# Patient Record
Sex: FEMALE | Race: BLACK OR AFRICAN AMERICAN | Employment: PART TIME | URBAN - METROPOLITAN AREA
[De-identification: names, ages, dates, MRNs, and addresses within clinical notes are randomized per-mention and may not be internally consistent; named-entity substitution may affect disease eponyms.]

---

## 2017-06-19 ENCOUNTER — OFFICE VISIT (OUTPATIENT)
Dept: FAMILY MEDICINE CLINIC | Age: 42
End: 2017-06-19

## 2017-06-19 VITALS
TEMPERATURE: 98.2 F | HEIGHT: 68 IN | SYSTOLIC BLOOD PRESSURE: 149 MMHG | WEIGHT: 253.6 LBS | HEART RATE: 87 BPM | DIASTOLIC BLOOD PRESSURE: 101 MMHG | BODY MASS INDEX: 38.43 KG/M2 | RESPIRATION RATE: 18 BRPM | OXYGEN SATURATION: 97 %

## 2017-06-19 DIAGNOSIS — Z01.419 WELL WOMAN EXAM WITH ROUTINE GYNECOLOGICAL EXAM: ICD-10-CM

## 2017-06-19 DIAGNOSIS — I10 ESSENTIAL HYPERTENSION WITH GOAL BLOOD PRESSURE LESS THAN 140/90: Primary | ICD-10-CM

## 2017-06-19 DIAGNOSIS — L81.9 HYPERPIGMENTED SKIN LESION: ICD-10-CM

## 2017-06-19 DIAGNOSIS — Z12.31 ENCOUNTER FOR SCREENING MAMMOGRAM FOR BREAST CANCER: ICD-10-CM

## 2017-06-19 DIAGNOSIS — R53.83 FATIGUE, UNSPECIFIED TYPE: ICD-10-CM

## 2017-06-19 DIAGNOSIS — N63.0 BREAST LUMP IN FEMALE: ICD-10-CM

## 2017-06-19 RX ORDER — FLUCONAZOLE 150 MG/1
150 TABLET ORAL DAILY
Qty: 1 TAB | Refills: 0 | Status: SHIPPED | OUTPATIENT
Start: 2017-06-19 | End: 2017-06-20

## 2017-06-19 RX ORDER — HYDROCHLOROTHIAZIDE 25 MG/1
25 TABLET ORAL DAILY
Qty: 90 TAB | Refills: 1 | Status: SHIPPED | OUTPATIENT
Start: 2017-06-19 | End: 2018-06-12 | Stop reason: SDUPTHER

## 2017-06-19 RX ORDER — AMLODIPINE BESYLATE 10 MG/1
10 TABLET ORAL DAILY
Qty: 90 TAB | Refills: 1 | Status: SHIPPED | OUTPATIENT
Start: 2017-06-19 | End: 2018-06-12 | Stop reason: SDUPTHER

## 2017-06-19 RX ORDER — LOSARTAN POTASSIUM 50 MG/1
50 TABLET ORAL DAILY
Qty: 90 TAB | Refills: 1 | Status: SHIPPED | OUTPATIENT
Start: 2017-06-19 | End: 2018-06-12 | Stop reason: SDUPTHER

## 2017-06-19 NOTE — PATIENT INSTRUCTIONS

## 2017-06-19 NOTE — MR AVS SNAPSHOT
Visit Information Date & Time Provider Department Dept. Phone Encounter #  
 6/19/2017 12:40 PM Villa Keenan, [address] [facility] [phone] [id number] Follow-up Instructions Return in about 1 year (around 6/19/2018) for wellness physical. . Upcoming Health Maintenance Date Due DTaP/Tdap/Td series (1 - Tdap) 9/8/1997 PAP AKA CERVICAL CYTOLOGY 4/15/2016 BREAST CANCER SCRN MAMMOGRAM 6/22/2017 INFLUENZA AGE 9 TO ADULT 8/1/2017 COLONOSCOPY 9/23/2019 Allergies as of 6/19/2017  Review Complete On: 6/19/2017 By: Villa Keenan DO Severity Noted Reaction Type Reactions Lisinopril  06/22/2015    Cough Current Immunizations  Never Reviewed No immunizations on file. Not reviewed this visit You Were Diagnosed With   
  
 Codes Comments Essential hypertension with goal blood pressure less than 140/90    -  Primary ICD-10-CM: I10 
ICD-9-CM: 401.9 Well woman exam with routine gynecological exam     ICD-10-CM: Z71.395 ICD-9-CM: V72.31 Fatigue, unspecified type     ICD-10-CM: R53.83 ICD-9-CM: 780.79 Hyperpigmented skin lesion     ICD-10-CM: L81.9 ICD-9-CM: 709.00 Encounter for screening mammogram for breast cancer     ICD-10-CM: Z12.31 
ICD-9-CM: V76.12 Breast lump in female     ICD-10-CM: N63 
ICD-9-CM: 611.72 Vitals BP Pulse Temp Resp Height(growth percentile) Weight(growth percentile) (!) 149/101 (BP 1 Location: Right arm, BP Patient Position: Sitting) 87 98.2 °F (36.8 °C) (Oral) 18 5' 8\" (1.727 m) 253 lb 9.6 oz (115 kg) SpO2 BMI OB Status Smoking Status 97% 38.56 kg/m2 Having regular periods Never Smoker BMI and BSA Data Body Mass Index Body Surface Area 38.56 kg/m 2 2.35 m 2 Preferred Pharmacy Pharmacy Name Phone [address] [address] [phone] Your Updated Medication List  
  
 This list is accurate as of: 6/19/17  2:27 PM.  Always use your most recent med list. amLODIPine 10 mg tablet Commonly known as:  Sonal Hernandez Take 1 Tab by mouth daily. fluconazole 150 mg tablet Commonly known as:  DIFLUCAN Take 1 Tab by mouth daily for 1 day. FDA advises cautious prescribing of oral fluconazole in pregnancy. hydroCHLOROthiazide 25 mg tablet Commonly known as:  HYDRODIURIL Take 1 Tab by mouth daily. ibuprofen 600 mg tablet Commonly known as:  MOTRIN Take 1 Tab by mouth every six (6) hours as needed for Pain.  
  
 losartan 50 mg tablet Commonly known as:  COZAAR Take 1 Tab by mouth daily. Prescriptions Sent to Pharmacy Refills  
 fluconazole (DIFLUCAN) 150 mg tablet 0 Sig: Take 1 Tab by mouth daily for 1 day. FDA advises cautious prescribing of oral fluconazole in pregnancy. Class: Normal  
 Pharmacy: [facility] [address] [place] [address] [address]  #: [phone] Route: Oral  
  
We Performed the Following REFERRAL TO DERMATOLOGY [REF19 Custom] Comments:  
 Please evaluate patient for hyperpigmented lesions Follow-up Instructions Return in about 1 year (around 6/19/2018) for wellness physical. . To-Do List   
 06/19/2017 Lab:  CBC WITH AUTOMATED DIFF   
  
 06/19/2017 Lab:  LIPID PANEL   
  
 06/19/2017 Imaging:  PARRISH MAMMO BI DX INCL CAD   
  
 06/19/2017 Lab:  METABOLIC PANEL, COMPREHENSIVE   
  
 06/19/2017 Pathology:  PAP IG, APTIMA HPV AND RFX 16/18,45 ([id number])   
  
 06/19/2017 Lab:  T4, FREE   
  
 06/19/2017 Lab:  TSH 3RD GENERATION   
  
 06/19/2017 Imaging:  US BREASTS LIMITED=<3 QUAD Referral Information Referral ID Referred By Referred To  
  
 [id number] Luigi Ann MD   
   [facility], [address] [place] [address] Road Phone: [phone] Fax: [phone] Visits Status Start Date End Date 1 New Request 6/19/17 6/19/18 If your referral has a status of pending review or denied, additional information will be sent to support the outcome of this decision. Patient Instructions Well Visit, Ages 25 to 48: Care Instructions Your Care Instructions Physical exams can help you stay healthy. Your doctor has checked your overall health and may have suggested ways to take good care of yourself. He or she also may have recommended tests. At home, you can help prevent illness with healthy eating, regular exercise, and other steps. Follow-up care is a key part of your treatment and safety. Be sure to make and go to all appointments, and call your doctor if you are having problems. It's also a good idea to know your test results and keep a list of the medicines you take. How can you care for yourself at home? · Reach and stay at a healthy weight. This will lower your risk for many problems, such as obesity, diabetes, heart disease, and high blood pressure. · Get at least 30 minutes of physical activity on most days of the week. Walking is a good choice. You also may want to do other activities, such as running, swimming, cycling, or playing tennis or team sports. Discuss any changes in your exercise program with your doctor. · Do not smoke or allow others to smoke around you. If you need help quitting, talk to your doctor about stop-smoking programs and medicines. These can increase your chances of quitting for good. · Talk to your doctor about whether you have any risk factors for sexually transmitted infections (STIs). Having one sex partner (who does not have STIs and does not have sex with anyone else) is a good way to avoid these infections. · Use birth control if you do not want to have children at this time. Talk with your doctor about the choices available and what might be best for you. · Protect your skin from too much sun. When you're outdoors from 10 a.m. to 4 p.m., stay in the shade or cover up with clothing and a hat with a wide brim. Wear sunglasses that block UV rays. Even when it's cloudy, put broad-spectrum sunscreen (SPF 30 or higher) on any exposed skin. · See a dentist one or two times a year for checkups and to have your teeth cleaned. · Wear a seat belt in the car. · Drink alcohol in moderation, if at all. That means no more than 2 drinks a day for men and 1 drink a day for women. Follow your doctor's advice about when to have certain tests. These tests can spot problems early. For everyone · Cholesterol. Have the fat (cholesterol) in your blood tested after age 21. Your doctor will tell you how often to have this done based on your age, family history, or other things that can increase your risk for heart disease. · Blood pressure. Have your blood pressure checked during a routine doctor visit. Your doctor will tell you how often to check your blood pressure based on your age, your blood pressure results, and other factors. · Vision. Talk with your doctor about how often to have a glaucoma test. 
· Diabetes. Ask your doctor whether you should have tests for diabetes. · Colon cancer. Have a test for colon cancer at age 48. You may have one of several tests. If you are younger than 48, you may need a test earlier if you have any risk factors. Risk factors include whether you already had a precancerous polyp removed from your colon or whether your parent, brother, sister, or child has had colon cancer. For women · Breast exam and mammogram. Talk to your doctor about when you should have a clinical breast exam and a mammogram. Medical experts differ on whether and how often women under 50 should have these tests. Your doctor can help you decide what is right for you. · Pap test and pelvic exam. Begin Pap tests at age 24.  A Pap test is the best way to find cervical cancer. The test often is part of a pelvic exam. Ask how often to have this test. 
· Tests for sexually transmitted infections (STIs). Ask whether you should have tests for STIs. You may be at risk if you have sex with more than one person, especially if your partners do not wear condoms. For men · Tests for sexually transmitted infections (STIs). Ask whether you should have tests for STIs. You may be at risk if you have sex with more than one person, especially if you do not wear a condom. · Testicular cancer exam. Ask your doctor whether you should check your testicles regularly. · Prostate exam. Talk to your doctor about whether you should have a blood test (called a PSA test) for prostate cancer. Experts differ on whether and when men should have this test. Some experts suggest it if you are older than 39 and are -American or have a father or brother who got prostate cancer when he was younger than 72. When should you call for help? Watch closely for changes in your health, and be sure to contact your doctor if you have any problems or symptoms that concern you. Where can you learn more? Go to http://latoya-david.info/. Enter P072 in the search box to learn more about \"Well Visit, Ages 25 to 48: Care Instructions. \" Current as of: July 19, 2016 Content Version: 11.2 © 4535-3303 [employer]. Care instructions adapted under license by [employer] (which disclaims liability or warranty for this information). If you have questions about a medical condition or this instruction, always ask your healthcare professional. [place] [address] any warranty or liability for your use of this information. Introducing [facility]! [facility] introduces [employer] patient portal. Now you can access parts of your medical record, email your doctor's office, and request medication refills online. 1. In your internet browser, go to [email or web address] 2. Click on the First Time User? Click Here link in the Sign In box. You will see the New Member Sign Up page. 3. Enter your [employer] Access Code exactly as it appears below. You will not need to use this code after youve completed the sign-up process. If you do not sign up before the expiration date, you must request a new code. · [employer] Access Code: [email or web address] Expires: 9/17/2017  2:23 PM 
 
4. Enter the last four digits of your Social Security Number (xxxx) and Date of Birth (mm/dd/yyyy) as indicated and click Submit. You will be taken to the next sign-up page. 5. Create a Reksoftt ID. This will be your [employer] login ID and cannot be changed, so think of one that is secure and easy to remember. 6. Create a [employer] password. You can change your password at any time. 7. Enter your Password Reset Question and Answer. This can be used at a later time if you forget your password. 8. Enter your e-mail address. You will receive e-mail notification when new information is available in [address] 9. Click Sign Up. You can now view and download portions of your medical record. 10. Click the Download Summary menu link to download a portable copy of your medical information. If you have questions, please visit the Frequently Asked Questions section of the [employer] website. Remember, [employer] is NOT to be used for urgent needs. For medical emergencies, dial 911. Now available from your iPhone and Android! Please provide this summary of care documentation to your next provider. Your primary care clinician is listed as Catina Lopez. If you have any questions after today's visit, please call [phone].

## 2017-06-19 NOTE — PROGRESS NOTES
Subjective:   Enid Powell is a 37 y.o. female who presents for annual routine Pap and checkup. LMP: No LMP recorded. Last PAP?: 04/15/2013  History of abnormal PAP: None  Concerns for STD?: no  Abnormal vaginal discharge: no   Family history for GYN cancer: none  Family history breast cancer: PGM  Mammogram: 06/22/2016  Family history for colon cancer: paternal aunt and broter  Colonoscopy: done on 09/23/2014, normal. Pt advised to repeat in 5 years. Current Outpatient Prescriptions   Medication Sig Dispense Refill    hydrochlorothiazide (HYDRODIURIL) 25 mg tablet Take 1 Tab by mouth daily. 90 Tab 1    losartan (COZAAR) 50 mg tablet Take 1 Tab by mouth daily. 90 Tab 1    amLODIPine (NORVASC) 10 mg tablet Take 1 Tab by mouth daily. 90 Tab 1    ibuprofen (MOTRIN) 600 mg tablet Take 1 Tab by mouth every six (6) hours as needed for Pain. 20 Tab 0       Allergies   Allergen Reactions    Lisinopril Cough       Past Medical History:   Diagnosis Date    Anxiety     Elevated blood pressure (not hypertension) 6/10/2014    Hypertension     Rectal bleeding        Past Surgical History:   Procedure Laterality Date    COLONOSCOPY  9/24/2014 Return 9/25/2019    Dr. Adriana Weston; dx    HX BREAST LUMPECTOMY Right     benign.  HX COLONOSCOPY  age 45    rectal bleeding hemorrhoids found. f/u age 49 or sooner if bleeding.         Family History   Problem Relation Age of Onset    Hypertension Mother     Hypertension Father     Hypertension Other     Diabetes Maternal Grandmother     Stroke Maternal Grandmother     Cancer Maternal Grandmother      Kidney CA    Diabetes Maternal Grandfather     Cancer Maternal Grandfather     Cancer Paternal Grandmother      Breast CA, age 52's   [place] Breast Cancer Paternal Grandmother     Cancer Other 39       Social History     Social History    Marital status: SINGLE     Spouse name: N/A    Number of children: N/A    Years of education: N/A     Occupational History    Not on file. Social History Main Topics    Smoking status: Never Smoker    Smokeless tobacco: Never Used    Alcohol use 0.0 oz/week      Comment: 1-3 glass per day    Drug use: No    Sexual activity: Yes     Partners: Male     Birth control/ protection: None     Other Topics Concern     Service No    Blood Transfusions No    Weight Concern Yes    Exercise Yes    Seat Belt Yes    Self-Exams No     Social History Narrative       Review of Systems   Constitutional: Positive for malaise/fatigue. Negative for chills and fever. Eyes: Negative for blurred vision. Respiratory: Negative for shortness of breath. Cardiovascular: Negative for chest pain, palpitations and leg swelling. Gastrointestinal: Negative for constipation, diarrhea, nausea and vomiting. Musculoskeletal: Negative for joint pain. Neurological: Negative for headaches. Objective:     Visit Vitals    BP (!) 149/101 (BP 1 Location: Right arm, BP Patient Position: Sitting)    Pulse 87    Temp 98.2 °F (36.8 °C) (Oral)    Resp 18    Ht 5' 8\" (1.727 m)    Wt 253 lb 9.6 oz (115 kg)    SpO2 97%    BMI 38.56 kg/m2       Physical Exam   Constitutional: She is oriented to person, place, and time and well-developed, well-nourished, and in no distress. HENT:   Right Ear: Tympanic membrane, external ear and ear canal normal.   Left Ear: Tympanic membrane, external ear and ear canal normal.   Nose: Nose normal.   Mouth/Throat: Oropharynx is clear and moist.   Eyes: Pupils are equal, round, and reactive to light. Neck: Normal range of motion. Neck supple. No thyromegaly present. Cardiovascular: Normal rate, regular rhythm, normal heart sounds and intact distal pulses. No murmur heard. Pulmonary/Chest: Effort normal and breath sounds normal. She has no wheezes. Neurological: She is alert and oriented to person, place, and time. GCS score is 15. Skin: Skin is warm and dry.  Lesion (Hyperpigmented lesions on legs, forarms, neck, back and abdomen. ) noted. Vitals reviewed. Pelvic exam: VULVA: normal appearing vulva with no masses, tenderness or lesions, VAGINA: normal appearing vagina with normal color and discharge, no lesions, CERVIX: normal appearing cervix without discharge or lesions, UTERUS: uterus is nontender, fibroid uterus noted just below umbilicus, ADNEXA: normal adnexa in size, nontender and no masses. Assessment/Plan:   well woman  mammogram  pap smear  additional lab tests per orders  return annually or prn    ICD-10-CM ICD-9-CM    1. Essential hypertension with goal blood pressure less than 140/90 I10 401.9 LIPID PANEL      METABOLIC PANEL, COMPREHENSIVE   2. Well woman exam with routine gynecological exam Z01.419 V72.31    3. Fatigue, unspecified type R53.83 780.79 CBC WITH AUTOMATED DIFF      TSH 3RD GENERATION      T4, FREE   4. Hyperpigmented skin lesion L81.9 709.00 REFERRAL TO DERMATOLOGY   . Patient given opportunity to ask questions. Questions answered. Patient understands plan of care. Follow-up Disposition:  Return in about 1 year (around 6/19/2018) for wellness physical. .        Written by Norita Goltz, as dictated by DO. CHALINO Black, Dr. Chase Turner, confirm that all documentation is accurate.

## 2017-06-19 NOTE — PROGRESS NOTES
1. Have you been to the ER, urgent care clinic since your last visit? Hospitalized since your last visit? No    2. Have you seen or consulted any other health care providers outside of the [address] [place] [address] since your last visit? Include any pap smears or colon screening.  No  \

## 2017-06-19 NOTE — PROGRESS NOTES
Subjective:     HPI:  Rizwan Mahoney is a 37 y.o. female who presents to the office today to f/u on hypertension and c/o menorrhagia and skin problem. Menorrhagia   Pt states that she has cramps and pain in the first couple of days of her menstrual cycle. Pt reports that the first 2 days of her menstrual cycle is comparatively heavier. Pt states that she changes her pad up to 6 times, about every two hours, during her heaviest day. Pt reports being fatigued all the time. Hypertension  The patient presents for follow up of hypertension. Pt's BP is 149/101 in the office today. Cardiovascular ROS: taking medications as instructed, no medication side effects noted, no TIA's, no chest pain on exertion, no dyspnea on exertion, no swelling of ankles. Skin Problem  Pt has itchy black spots on her skin all over her body. Pt states that she has seen [facility] for her skin problem and reports that medication given by them did not help. Current Outpatient Prescriptions   Medication Sig Dispense Refill    hydrochlorothiazide (HYDRODIURIL) 25 mg tablet Take 1 Tab by mouth daily. 90 Tab 1    losartan (COZAAR) 50 mg tablet Take 1 Tab by mouth daily. 90 Tab 1    amLODIPine (NORVASC) 10 mg tablet Take 1 Tab by mouth daily. 90 Tab 1    ibuprofen (MOTRIN) 600 mg tablet Take 1 Tab by mouth every six (6) hours as needed for Pain. 20 Tab 0        Allergies   Allergen Reactions    Lisinopril Cough       Past Medical History:   Diagnosis Date    Anxiety     Elevated blood pressure (not hypertension) 6/10/2014    Hypertension     Rectal bleeding         Past Surgical History:   Procedure Laterality Date    COLONOSCOPY  9/24/2014 Return 9/25/2019    Dr. Shazia Florez; dx    HX BREAST LUMPECTOMY Right     benign.  HX COLONOSCOPY  age 45    rectal bleeding hemorrhoids found. f/u age 49 or sooner if bleeding.         Family History   Problem Relation Age of Onset    Hypertension Mother    [facility] Hypertension Father     Hypertension Other     Diabetes Maternal Grandmother     Stroke Maternal Grandmother     Cancer Maternal Grandmother      Kidney CA    Diabetes Maternal Grandfather     Cancer Maternal Grandfather     Cancer Paternal Grandmother      Breast CA, age 52's    Breast Cancer Paternal Grandmother     Cancer Other 39       Social History     Social History    Marital status: SINGLE     Spouse name: N/A    Number of children: N/A    Years of education: N/A     Occupational History    Not on file. Social History Main Topics    Smoking status: Never Smoker    Smokeless tobacco: Never Used    Alcohol use 0.0 oz/week      Comment: 1-3 glass per day    Drug use: No    Sexual activity: Yes     Partners: Male     Birth control/ protection: None     Other Topics Concern     Service No    Blood Transfusions No    Weight Concern Yes    Exercise Yes    Seat Belt Yes    Self-Exams No     Social History Narrative       REVIEW OF SYSTEM:  Review of Systems   Constitutional: Positive for malaise/fatigue. Negative for chills and fever. Eyes: Negative for blurred vision. Respiratory: Negative for shortness of breath. Cardiovascular: Negative for chest pain, palpitations and leg swelling. Gastrointestinal: Negative for constipation, diarrhea, nausea and vomiting. Musculoskeletal: Negative for joint pain. Neurological: Negative for headaches. Objective:     Visit Vitals    BP (!) 149/101 (BP 1 Location: Right arm, BP Patient Position: Sitting)    Pulse 87    Temp 98.2 °F (36.8 °C) (Oral)    Resp 18    Ht 5' 8\" (1.727 m)    Wt 253 lb 9.6 oz (115 kg)    SpO2 97%    BMI 38.56 kg/m2       PHYSICAL EXAM:  Physical Exam   Constitutional: She is oriented to person, place, and time and well-developed, well-nourished, and in no distress.    HENT:   Right Ear: Tympanic membrane, external ear and ear canal normal.   Left Ear: Tympanic membrane, external ear and ear canal normal.   Nose: Nose normal.   Mouth/Throat: Oropharynx is clear and moist.   Eyes: Pupils are equal, round, and reactive to light. Neck: Normal range of motion. Neck supple. No thyromegaly present. Cardiovascular: Normal rate, regular rhythm, normal heart sounds and intact distal pulses. No murmur heard. Pulmonary/Chest: Effort normal and breath sounds normal. She has no wheezes. Neurological: She is alert and oriented to person, place, and time. GCS score is 15. Skin: Skin is warm and dry. Lesion noted. Multiple Hyperpigmented lesions to legs, forearms, back, neck and abdomen. Vitals reviewed. Assessment/Plan:       ICD-10-CM ICD-9-CM    1. Essential hypertension with goal blood pressure less than 140/90 I10 401.9 LIPID PANEL      METABOLIC PANEL, COMPREHENSIVE      hydroCHLOROthiazide (HYDRODIURIL) 25 mg tablet      losartan (COZAAR) 50 mg tablet      amLODIPine (NORVASC) 10 mg tablet      LIPID PANEL      METABOLIC PANEL, COMPREHENSIVE   2. Well woman exam with routine gynecological exam Z01.419 V72.31 fluconazole (DIFLUCAN) 150 mg tablet      PAP IG, APTIMA HPV AND RFX 16/18,45 ([id number])      PAP IG, APTIMA HPV AND RFX 16/18,45 ([id number])   3. Fatigue, unspecified type R53.83 780.79 CBC WITH AUTOMATED DIFF      TSH 3RD GENERATION      T4, FREE      CBC WITH AUTOMATED DIFF      TSH 3RD GENERATION      T4, FREE   4. Hyperpigmented skin lesion L81.9 709.00 REFERRAL TO DERMATOLOGY   5. Encounter for screening mammogram for breast cancer Z12.31 V76.12 PARRISH MAMMO BI DX INCL CAD      US BREASTS LIMITED=<3 QUAD   6. Breast lump in female N63 611.72 [facility] MAMMO BI DX INCL CAD      US BREASTS LIMITED=<3 QUAD     Patient given opportunity to ask questions. Questions answered. Patient understands plan of care. Follow-up Disposition: Not on File        Written by Chika Arellano, as dictated by eJnnifer Cook DO.    I, Dr. Jennifer Cook, confirm that all documentation is accurate.

## 2017-06-20 LAB
A-G RATIO,AGRAT: 1.6 RATIO (ref 1.1–2.6)
ABSOLUTE LYMPHOCYTE COUNT, 10803: 2.4 K/UL (ref 1–4.8)
ALBUMIN SERPL-MCNC: 4.3 G/DL (ref 3.5–5)
ALP SERPL-CCNC: 73 U/L (ref 25–115)
ALT SERPL-CCNC: 17 U/L (ref 5–40)
ANION GAP SERPL CALC-SCNC: 17 MMOL/L
AST SERPL W P-5'-P-CCNC: 17 U/L (ref 10–37)
BASOPHILS # BLD: 0 K/UL (ref 0–0.2)
BASOPHILS NFR BLD: 1 % (ref 0–2)
BILIRUB SERPL-MCNC: 0.2 MG/DL (ref 0.2–1.2)
BUN SERPL-MCNC: 13 MG/DL (ref 6–22)
CALCIUM SERPL-MCNC: 9 MG/DL (ref 8.4–10.5)
CHLORIDE SERPL-SCNC: 100 MMOL/L (ref 98–110)
CHOLEST SERPL-MCNC: 215 MG/DL (ref 110–200)
CO2 SERPL-SCNC: 24 MMOL/L (ref 20–32)
CREAT SERPL-MCNC: 0.8 MG/DL (ref 0.5–1.2)
EOSINOPHIL # BLD: 0.1 K/UL (ref 0–0.5)
EOSINOPHIL NFR BLD: 2 % (ref 0–6)
ERYTHROCYTE [DISTWIDTH] IN BLOOD BY AUTOMATED COUNT: 16.1 % (ref 10–16)
GFRAA, 66117: >60
GFRNA, 66118: >60
GLOBULIN,GLOB: 2.7 G/DL (ref 2–4)
GLUCOSE SERPL-MCNC: 85 MG/DL (ref 65–99)
GRANULOCYTES,GRANS: 47 % (ref 40–75)
HCT VFR BLD AUTO: 38.6 % (ref 35.1–46.5)
HDLC SERPL-MCNC: 82 MG/DL (ref 40–59)
HGB BLD-MCNC: 12.1 G/DL (ref 11.7–15.5)
HPV H RFLX, 13184: NEGATIVE
LDLC SERPL CALC-MCNC: 120 MG/DL (ref 50–99)
LYMPHOCYTES, LYMLT: 41 % (ref 27–45)
MCH RBC QN AUTO: 28 PG (ref 26–34)
MCHC RBC AUTO-ENTMCNC: 31 G/DL (ref 32–36)
MCV RBC AUTO: 89 FL (ref 80–95)
MONOCYTES # BLD: 0.5 K/UL (ref 0.1–0.9)
MONOCYTES NFR BLD: 9 % (ref 3–9)
NEUTROPHILS # BLD AUTO: 2.7 K/UL (ref 1.8–7.7)
PLATELET # BLD AUTO: 240 K/UL (ref 140–440)
PMV BLD AUTO: 12.3 FL (ref 6–10.8)
POTASSIUM SERPL-SCNC: 4.3 MMOL/L (ref 3.5–5.5)
PROT SERPL-MCNC: 7 G/DL (ref 6.4–8.3)
RBC # BLD AUTO: 4.33 M/UL (ref 3.8–5.2)
SODIUM SERPL-SCNC: 141 MMOL/L (ref 133–145)
T4 FREE SERPL-MCNC: 1.1 NG/DL (ref 0.9–1.8)
TRIGL SERPL-MCNC: 66 MG/DL (ref 40–149)
TSH SERPL DL<=0.005 MIU/L-ACNC: 1.19 MCU/ML (ref 0.27–4.2)
VLDLC SERPL CALC-MCNC: 13 MG/DL (ref 8–30)
WBC # BLD AUTO: 5.8 K/UL (ref 4–11)

## 2017-06-21 LAB — PAP IMAGE GUIDED, 8900296: NORMAL

## 2018-06-05 ENCOUNTER — APPOINTMENT (OUTPATIENT)
Dept: CT IMAGING | Age: 43
End: 2018-06-05
Attending: NURSE PRACTITIONER
Payer: COMMERCIAL

## 2018-06-05 ENCOUNTER — HOSPITAL ENCOUNTER (EMERGENCY)
Age: 43
Discharge: HOME | End: 2018-06-05
Attending: EMERGENCY MEDICINE
Payer: COMMERCIAL

## 2018-06-05 VITALS
OXYGEN SATURATION: 100 % | DIASTOLIC BLOOD PRESSURE: 74 MMHG | RESPIRATION RATE: 16 BRPM | BODY MASS INDEX: 35.7 KG/M2 | TEMPERATURE: 98.6 F | WEIGHT: 241 LBS | SYSTOLIC BLOOD PRESSURE: 126 MMHG | HEIGHT: 69 IN | HEART RATE: 71 BPM

## 2018-06-05 DIAGNOSIS — R10.9 ABDOMINAL PAIN, UNSPECIFIED ABDOMINAL LOCATION: ICD-10-CM

## 2018-06-05 DIAGNOSIS — R19.00 ABDOMINAL MASS, UNSPECIFIED ABDOMINAL LOCATION: ICD-10-CM

## 2018-06-05 DIAGNOSIS — D25.9 UTERINE LEIOMYOMA, UNSPECIFIED LOCATION: Primary | ICD-10-CM

## 2018-06-05 DIAGNOSIS — R03.0 ELEVATED BLOOD PRESSURE READING: ICD-10-CM

## 2018-06-05 LAB
ALBUMIN SERPL-MCNC: 3.6 G/DL (ref 3.4–5)
ALBUMIN/GLOB SERPL: 0.9 {RATIO} (ref 0.8–1.7)
ALP SERPL-CCNC: 61 U/L (ref 45–117)
ALT SERPL-CCNC: 16 U/L (ref 13–56)
ANION GAP SERPL CALC-SCNC: 8 MMOL/L (ref 3–18)
APPEARANCE UR: CLEAR
AST SERPL-CCNC: 15 U/L (ref 15–37)
BASOPHILS # BLD: 0.1 K/UL (ref 0–0.06)
BASOPHILS NFR BLD: 1 % (ref 0–2)
BILIRUB SERPL-MCNC: 0.2 MG/DL (ref 0.2–1)
BILIRUB UR QL: NEGATIVE
BUN SERPL-MCNC: 14 MG/DL (ref 7–18)
BUN/CREAT SERPL: 14 (ref 12–20)
CALCIUM SERPL-MCNC: 8.2 MG/DL (ref 8.5–10.1)
CHLORIDE SERPL-SCNC: 106 MMOL/L (ref 100–108)
CO2 SERPL-SCNC: 26 MMOL/L (ref 21–32)
COLOR UR: YELLOW
CREAT SERPL-MCNC: 1.02 MG/DL (ref 0.6–1.3)
DIFFERENTIAL METHOD BLD: ABNORMAL
EOSINOPHIL # BLD: 0.1 K/UL (ref 0–0.4)
EOSINOPHIL NFR BLD: 2 % (ref 0–5)
ERYTHROCYTE [DISTWIDTH] IN BLOOD BY AUTOMATED COUNT: 15.3 % (ref 11.6–14.5)
GLOBULIN SER CALC-MCNC: 3.9 G/DL (ref 2–4)
GLUCOSE SERPL-MCNC: 80 MG/DL (ref 74–99)
GLUCOSE UR STRIP.AUTO-MCNC: NEGATIVE MG/DL
HCG UR QL: NEGATIVE
HCT VFR BLD AUTO: 36.3 % (ref 35–45)
HGB BLD-MCNC: 12.2 G/DL (ref 12–16)
HGB UR QL STRIP: NEGATIVE
KETONES UR QL STRIP.AUTO: NEGATIVE MG/DL
LEUKOCYTE ESTERASE UR QL STRIP.AUTO: NEGATIVE
LIPASE SERPL-CCNC: 133 U/L (ref 73–393)
LYMPHOCYTES # BLD: 2.2 K/UL (ref 0.9–3.6)
LYMPHOCYTES NFR BLD: 36 % (ref 21–52)
MCH RBC QN AUTO: 27.9 PG (ref 24–34)
MCHC RBC AUTO-ENTMCNC: 33.6 G/DL (ref 31–37)
MCV RBC AUTO: 83.1 FL (ref 74–97)
MONOCYTES # BLD: 0.5 K/UL (ref 0.05–1.2)
MONOCYTES NFR BLD: 9 % (ref 3–10)
NEUTS SEG # BLD: 3.1 K/UL (ref 1.8–8)
NEUTS SEG NFR BLD: 52 % (ref 40–73)
NITRITE UR QL STRIP.AUTO: NEGATIVE
PH UR STRIP: 5 [PH] (ref 5–8)
PLATELET # BLD AUTO: 325 K/UL (ref 135–420)
PMV BLD AUTO: 11.7 FL (ref 9.2–11.8)
POTASSIUM SERPL-SCNC: 4 MMOL/L (ref 3.5–5.5)
PROT SERPL-MCNC: 7.5 G/DL (ref 6.4–8.2)
PROT UR STRIP-MCNC: NEGATIVE MG/DL
RBC # BLD AUTO: 4.37 M/UL (ref 4.2–5.3)
SODIUM SERPL-SCNC: 140 MMOL/L (ref 136–145)
SP GR UR REFRACTOMETRY: 1.03 (ref 1–1.03)
UROBILINOGEN UR QL STRIP.AUTO: 0.2 EU/DL (ref 0.2–1)
WBC # BLD AUTO: 6 K/UL (ref 4.6–13.2)

## 2018-06-05 PROCEDURE — 80053 COMPREHEN METABOLIC PANEL: CPT

## 2018-06-05 PROCEDURE — 85025 COMPLETE CBC W/AUTO DIFF WBC: CPT

## 2018-06-05 PROCEDURE — 99282 EMERGENCY DEPT VISIT SF MDM: CPT

## 2018-06-05 PROCEDURE — 74011636320 HC RX REV CODE- 636/320: Performed by: EMERGENCY MEDICINE

## 2018-06-05 PROCEDURE — 74177 CT ABD & PELVIS W/CONTRAST: CPT

## 2018-06-05 PROCEDURE — 81003 URINALYSIS AUTO W/O SCOPE: CPT

## 2018-06-05 PROCEDURE — 96374 THER/PROPH/DIAG INJ IV PUSH: CPT

## 2018-06-05 PROCEDURE — 74011250636 HC RX REV CODE- 250/636: Performed by: NURSE PRACTITIONER

## 2018-06-05 PROCEDURE — 83690 ASSAY OF LIPASE: CPT

## 2018-06-05 PROCEDURE — 81025 URINE PREGNANCY TEST: CPT

## 2018-06-05 RX ORDER — NAPROXEN SODIUM 550 MG/1
550 TABLET ORAL 2 TIMES DAILY WITH MEALS
Qty: 20 TAB | Refills: 0 | Status: SHIPPED | OUTPATIENT
Start: 2018-06-05 | End: 2018-06-12 | Stop reason: SDUPTHER

## 2018-06-05 RX ORDER — HYDROCODONE BITARTRATE AND ACETAMINOPHEN 5; 325 MG/1; MG/1
1 TABLET ORAL
Qty: 6 TAB | Refills: 0 | Status: SHIPPED | OUTPATIENT
Start: 2018-06-05 | End: 2018-06-25 | Stop reason: ALTCHOICE

## 2018-06-05 RX ORDER — LORAZEPAM 2 MG/ML
1 INJECTION INTRAMUSCULAR
Status: DISCONTINUED | OUTPATIENT
Start: 2018-06-05 | End: 2018-06-05 | Stop reason: HOSPADM

## 2018-06-05 RX ORDER — HYDRALAZINE HYDROCHLORIDE 20 MG/ML
20 INJECTION INTRAMUSCULAR; INTRAVENOUS ONCE
Status: COMPLETED | OUTPATIENT
Start: 2018-06-05 | End: 2018-06-05

## 2018-06-05 RX ADMIN — HYDRALAZINE HYDROCHLORIDE 20 MG: 20 INJECTION INTRAMUSCULAR; INTRAVENOUS at 15:47

## 2018-06-05 RX ADMIN — IOPAMIDOL 80 ML: 612 INJECTION, SOLUTION INTRAVENOUS at 15:06

## 2018-06-05 NOTE — DISCHARGE INSTRUCTIONS
Abdominal Pain: Care Instructions  Your Care Instructions    Abdominal pain has many possible causes. Some aren't serious and get better on their own in a few days. Others need more testing and treatment. If your pain continues or gets worse, you need to be rechecked and may need more tests to find out what is wrong. You may need surgery to correct the problem. Don't ignore new symptoms, such as fever, nausea and vomiting, urination problems, pain that gets worse, and dizziness. These may be signs of a more serious problem. Your doctor may have recommended a follow-up visit in the next 8 to 12 hours. If you are not getting better, you may need more tests or treatment. The doctor has checked you carefully, but problems can develop later. If you notice any problems or new symptoms, get medical treatment right away. Follow-up care is a key part of your treatment and safety. Be sure to make and go to all appointments, and call your doctor if you are having problems. It's also a good idea to know your test results and keep a list of the medicines you take. How can you care for yourself at home? · Rest until you feel better. · To prevent dehydration, drink plenty of fluids, enough so that your urine is light yellow or clear like water. Choose water and other caffeine-free clear liquids until you feel better. If you have kidney, heart, or liver disease and have to limit fluids, talk with your doctor before you increase the amount of fluids you drink. · If your stomach is upset, eat mild foods, such as rice, dry toast or crackers, bananas, and applesauce. Try eating several small meals instead of two or three large ones. · Wait until 48 hours after all symptoms have gone away before you have spicy foods, alcohol, and drinks that contain caffeine. · Do not eat foods that are high in fat. · Avoid anti-inflammatory medicines such as aspirin, ibuprofen (Advil, Motrin), and naproxen (Aleve).  These can cause stomach upset. Talk to your doctor if you take daily aspirin for another health problem. When should you call for help? Call 911 anytime you think you may need emergency care. For example, call if:  ? · You passed out (lost consciousness). ? · You pass maroon or very bloody stools. ? · You vomit blood or what looks like coffee grounds. ? · You have new, severe belly pain. ?Call your doctor now or seek immediate medical care if:  ? · Your pain gets worse, especially if it becomes focused in one area of your belly. ? · You have a new or higher fever. ? · Your stools are black and look like tar, or they have streaks of blood. ? · You have unexpected vaginal bleeding. ? · You have symptoms of a urinary tract infection. These may include:  ¨ Pain when you urinate. ¨ Urinating more often than usual.  ¨ Blood in your urine. ? · You are dizzy or lightheaded, or you feel like you may faint. ? Watch closely for changes in your health, and be sure to contact your doctor if:  ? · You are not getting better after 1 day (24 hours). Where can you learn more? Go to http://latoyaBottomline Technologiesdavid.info/. Enter H960 in the search box to learn more about \"Abdominal Pain: Care Instructions. \"  Current as of: March 20, 2017  Content Version: 11.4  © 2567-4468 [employer]. Care instructions adapted under license by [employer] (which disclaims liability or warranty for this information). If you have questions about a medical condition or this instruction, always ask your healthcare professional. [place] [address] any warranty or liability for your use of this information. FatTail Activation    Thank you for requesting access to [employer]Tail. Please follow the instructions below to securely access and download your online medical record.  FatTail allows you to send messages to your doctor, view your test results, renew your prescriptions, schedule appointments, and more.    How Do I Sign Up? 1. In your internet browser, go to [email or web address]  2. Click on the First Time User? Click Here link in the Sign In box. You will be redirect to the New Member Sign Up page. 3. Enter your [facility] Access Code exactly as it appears below. You will not need to use this code after youve completed the sign-up process. If you do not sign up before the expiration date, you must request a new code. [facility] Access Code: [email or web address]  Expires: 9/3/2018 12:14 PM (This is the date your [facility] access code will )    4. Enter the last four digits of your Social Security Number (xxxx) and Date of Birth (mm/dd/yyyy) as indicated and click Submit. You will be taken to the next sign-up page. 5. Create a [facility] ID. This will be your [facility] login ID and cannot be changed, so think of one that is secure and easy to remember. 6. Create a [facility] password. You can change your password at any time. 7. Enter your Password Reset Question and Answer. This can be used at a later time if you forget your password. 8. Enter your e-mail address. You will receive e-mail notification when new information is available in [address] 9. Click Sign Up. You can now view and download portions of your medical record. 10. Click the Download Summary menu link to download a portable copy of your medical information. Additional Information    If you have questions, please visit the Frequently Asked Questions section of the [facility] website at [email or web address] Remember, [facility] is NOT to be used for urgent needs. For medical emergencies, dial 911.

## 2018-06-05 NOTE — ED TRIAGE NOTES
Noted last Monday lump on left side of abdomen . Painful to touch. Radiates to back. Also just refilled BP meds.  Not taken today

## 2018-06-05 NOTE — ED PROVIDER NOTES
HPI Comments: Gemini Atkinson is a 40year old female who present to the ED with a c/o abdominal pain and a palpable abdominal mass. No report of N&V. States the pain has been hurting for a week. She has not self medicated. Nothing has made it better or worse. Patient is a 40 y.o. female presenting with abdominal pain. The history is provided by the patient. History limited by: No communication barrier. Abdominal Pain    This is a new problem. The problem occurs constantly. The problem has not changed since onset. The pain is moderate. Nothing worsens the pain. The pain is relieved by nothing. Past Medical History:   Diagnosis Date    Anxiety     Elevated blood pressure (not hypertension) 6/10/2014    Hypertension     Rectal bleeding        Past Surgical History:   Procedure Laterality Date    COLONOSCOPY  9/24/2014 Return 9/25/2019    Dr. Byers Dies; dx    HX BREAST LUMPECTOMY Right     benign.  HX COLONOSCOPY  age 45    rectal bleeding hemorrhoids found. f/u age 49 or sooner if bleeding. Family History:   Problem Relation Age of Onset    Hypertension Mother     Hypertension Father     Hypertension Other     Diabetes Maternal Grandmother     Stroke Maternal Grandmother     Cancer Maternal Grandmother      Kidney CA    Diabetes Maternal Grandfather     Cancer Maternal Grandfather     Cancer Paternal Grandmother      Breast CA, age 52's   Tivis Abelson Breast Cancer Paternal Grandmother     Cancer Other 40       Social History     Social History    Marital status: SINGLE     Spouse name: N/A    Number of children: N/A    Years of education: N/A     Occupational History    Not on file.      Social History Main Topics    Smoking status: Never Smoker    Smokeless tobacco: Never Used    Alcohol use 0.0 oz/week      Comment: 1-3 glass per day    Drug use: No    Sexual activity: Yes     Partners: Male     Birth control/ protection: None     Other Topics Concern   [employer] Service No    Blood Transfusions No    Weight Concern Yes    Exercise Yes    Seat Belt Yes    Self-Exams No     Social History Narrative         ALLERGIES: Lisinopril    Review of Systems   Constitutional: Negative. HENT: Negative. Eyes: Negative. Respiratory: Negative. Cardiovascular: Negative. Gastrointestinal: Positive for abdominal pain. Endocrine: Negative. Genitourinary: Negative. Musculoskeletal: Negative. Skin: Negative. Allergic/Immunologic: Negative. Neurological: Negative. Hematological: Negative. Psychiatric/Behavioral: Negative. Vitals:    06/05/18 1219   BP: (!) 221/138   Pulse: 93   Resp: 17   Temp: 98.3 °F (36.8 °C)   SpO2: 100%   Weight: 109.3 kg (241 lb)   Height: 5' 9\" (1.753 m)            Physical Exam   Constitutional: She is oriented to person, place, and time. She appears well-developed and well-nourished. No distress. HENT:   Head: Normocephalic and atraumatic. Eyes: EOM are normal. Pupils are equal, round, and reactive to light. Neck: Normal range of motion. Neck supple. Cardiovascular: Normal rate, regular rhythm and normal heart sounds. Pulmonary/Chest: Effort normal and breath sounds normal. No respiratory distress. She has no wheezes. She has no rales. Abdominal: Soft. Bowel sounds are normal. She exhibits no distension. There is tenderness. Genitourinary:   Genitourinary Comments: NE   Musculoskeletal: Normal range of motion. Neurological: She is alert and oriented to person, place, and time. No cranial nerve deficit. Skin: Skin is warm and dry. Psychiatric: She has a normal mood and affect. Nursing note and vitals reviewed. MDM  Number of Diagnoses or Management Options  Abdominal mass, unspecified abdominal location:   Abdominal pain, unspecified abdominal location:   Elevated blood pressure reading:   Uterine leiomyoma, unspecified location:   Diagnosis management comments: MDM: the CT scan results were reviewed. Will refer to Dr Vicky Hunter for follow up   Eugenia Floyd NP  4:34 PM    PROGRESS NOTE:  One or more blood pressure readings were noted elevated during the Pt's presentation in the emergency department this date. This abnormal reading has been cited in the Pt's diagnosis, and they have been encouraged to follow up with their primary care physician, or referred to a consultant for further evaluation and treatment. Eugenia Floyd NP   4:39 PM           Amount and/or Complexity of Data Reviewed  Clinical lab tests: ordered and reviewed  Tests in the radiology section of CPT®: ordered and reviewed  Independent visualization of images, tracings, or specimens: yes    Risk of Complications, Morbidity, and/or Mortality  Presenting problems: moderate  Diagnostic procedures: moderate  Management options: moderate    Patient Progress  Patient progress: stable        ED Course       Procedures    Diagnosis:   1. Uterine leiomyoma, unspecified location    2. Abdominal pain, unspecified abdominal location    3. Abdominal mass, unspecified abdominal location          Disposition:   Discharged to Home      Follow-up Information     Follow up With Details Comments [facility] [address], DO Call to arrange follow up    [address] [address]      Flash Moreira MD Call in the morning to arrange follow up    [place] [address]  [phone]            Patient's Medications   Start Taking    HYDROCODONE-ACETAMINOPHEN (NORCO) 5-325 MG PER TABLET    Take 1 Tab by mouth every six (6) hours as needed for Pain. Max Daily Amount: 4 Tabs. NAPROXEN SODIUM (ANAPROX DS) 550 MG TABLET    Take 1 Tab by mouth two (2) times daily (with meals). Continue Taking    AMLODIPINE (NORVASC) 10 MG TABLET    Take 1 Tab by mouth daily. HYDROCHLOROTHIAZIDE (HYDRODIURIL) 25 MG TABLET    Take 1 Tab by mouth daily.     IBUPROFEN (MOTRIN) 600 MG TABLET    Take 1 Tab by mouth every six (6) hours as needed for Pain. LOSARTAN (COZAAR) 50 MG TABLET    Take 1 Tab by mouth daily.    These Medications have changed    No medications on file   Stop Taking    No medications on file

## 2018-06-12 ENCOUNTER — OFFICE VISIT (OUTPATIENT)
Dept: FAMILY MEDICINE CLINIC | Age: 43
End: 2018-06-12

## 2018-06-12 VITALS
TEMPERATURE: 97.1 F | RESPIRATION RATE: 14 BRPM | BODY MASS INDEX: 34.33 KG/M2 | WEIGHT: 231.8 LBS | OXYGEN SATURATION: 98 % | HEIGHT: 69 IN | HEART RATE: 65 BPM | SYSTOLIC BLOOD PRESSURE: 180 MMHG | DIASTOLIC BLOOD PRESSURE: 128 MMHG

## 2018-06-12 DIAGNOSIS — N94.6 DYSMENORRHEA: ICD-10-CM

## 2018-06-12 DIAGNOSIS — Z12.39 SCREENING FOR BREAST CANCER: ICD-10-CM

## 2018-06-12 DIAGNOSIS — N92.1 MENORRHAGIA WITH IRREGULAR CYCLE: ICD-10-CM

## 2018-06-12 DIAGNOSIS — I10 ESSENTIAL HYPERTENSION: Primary | ICD-10-CM

## 2018-06-12 DIAGNOSIS — Z13.220 SCREENING CHOLESTEROL LEVEL: ICD-10-CM

## 2018-06-12 RX ORDER — LOSARTAN POTASSIUM 100 MG/1
100 TABLET ORAL DAILY
Qty: 90 TAB | Refills: 1 | Status: SHIPPED | OUTPATIENT
Start: 2018-06-12

## 2018-06-12 RX ORDER — AMLODIPINE BESYLATE 10 MG/1
10 TABLET ORAL DAILY
Qty: 90 TAB | Refills: 1 | Status: SHIPPED | OUTPATIENT
Start: 2018-06-12

## 2018-06-12 RX ORDER — NAPROXEN SODIUM 550 MG/1
550 TABLET ORAL 2 TIMES DAILY WITH MEALS
Qty: 60 TAB | Refills: 2 | Status: SHIPPED | OUTPATIENT
Start: 2018-06-12 | End: 2018-06-13 | Stop reason: SDUPTHER

## 2018-06-12 RX ORDER — HYDROCHLOROTHIAZIDE 25 MG/1
25 TABLET ORAL DAILY
Qty: 90 TAB | Refills: 1 | Status: SHIPPED | OUTPATIENT
Start: 2018-06-12

## 2018-06-12 NOTE — PROGRESS NOTES
Thelma Sears is a 40 y.o. female presented to clinic for a a complete physical. Pt c/o 5/10 abd cramping. Pt would like to restart xanax. 1. Have you been to the ER, urgent care clinic since your last visit? Hospitalized since your last visit? Yes When: [place] 6/5/2018     2. Have you seen or consulted any other health care providers outside of the [address] [place] [address] since your last visit? Include any pap smears or colon screening.  No     Learning Assessment 3/5/2015   PRIMARY LEARNER Patient   PRIMARY LANGUAGE ENGLISH   LEARNER PREFERENCE PRIMARY DEMONSTRATION   ANSWERED BY patient   RELATIONSHIP SELF     Health Maintenance Due   Topic Date Due    DTaP/Tdap/Td series (1 - Tdap) 09/08/1997    BREAST CANCER SCRN MAMMOGRAM  06/22/2017

## 2018-06-12 NOTE — MR AVS SNAPSHOT
[facility] 
 
 
 [id number] [place] [address] [address] [facility] [address] [address] 
[phone] Patient: Kade Shearer MRN: [id number] TWF:[birth year] Visit Information Date & Time Provider Department Dept. Phone Encounter #  
 6/12/2018 11:20 AM Anitha Resendez[address]  [id number] Follow-up Instructions Return in about 1 year (around 6/12/2019). Your Appointments 6/19/2018  8:30 AM  
New Patient with John Rico MD  
[facility] ([facility]) Appt Note: Np/ref by [facility] ER for fibroids [facility] [phone]  
  
   
 [place] [id number] Upcoming Health Maintenance Date Due DTaP/Tdap/Td series (1 - Tdap) 9/8/1997 BREAST CANCER SCRN MAMMOGRAM 6/22/2017 Influenza Age 5 to Adult 8/1/2018 COLONOSCOPY 9/23/2019 PAP AKA CERVICAL CYTOLOGY 6/19/2020 Allergies as of 6/12/2018  Review Complete On: 6/12/2018 By: Anitha Resendez DO Severity Noted Reaction Type Reactions Lisinopril  06/22/2015    Cough Current Immunizations  Never Reviewed No immunizations on file. Not reviewed this visit You Were Diagnosed With   
  
 Codes Comments Essential hypertension    -  Primary ICD-10-CM: I10 
ICD-9-CM: 401.9 Menorrhagia with irregular cycle     ICD-10-CM: N92.1 ICD-9-CM: 626.2 Dysmenorrhea     ICD-10-CM: N94.6 ICD-9-CM: 625.3 Screening cholesterol level     ICD-10-CM: P05.172 ICD-9-CM: V77.91 Screening for breast cancer     ICD-10-CM: Z12.31 
ICD-9-CM: V76.10 Vitals BP Pulse Temp Resp Height(growth percentile) Weight(growth percentile) (!) 180/128 (BP 1 Location: Right arm, BP Patient Position: Sitting) 65 97.1 °F (36.2 °C) (Oral) 14 5' 9\" (1.753 m) 231 lb 12.8 oz (105.1 kg) LMP SpO2 BMI OB Status Smoking Status 06/09/2018 (Exact Date) 98% 34.23 kg/m2 Having regular periods Never Smoker BMI and BSA Data Body Mass Index Body Surface Area  
 34.23 kg/m 2 2.26 m 2 Preferred Pharmacy Pharmacy Name Phone [facility] [address] [place] [address]. [address] [phone] Your Updated Medication List  
  
   
This list is accurate as of 6/12/18 12:22 PM.  Always use your most recent med list. amLODIPine 10 mg tablet Commonly known as:  Jimi Rings Take 1 Tab by mouth daily. hydroCHLOROthiazide 25 mg tablet Commonly known as:  HYDRODIURIL Take 1 Tab by mouth daily. HYDROcodone-acetaminophen 5-325 mg per tablet Commonly known as:  Ruffus Homme Take 1 Tab by mouth every six (6) hours as needed for Pain. Max Daily Amount: 4 Tabs. ibuprofen 600 mg tablet Commonly known as:  MOTRIN Take 1 Tab by mouth every six (6) hours as needed for Pain.  
  
 losartan 100 mg tablet Commonly known as:  COZAAR Take 1 Tab by mouth daily. naproxen sodium 550 mg tablet Commonly known as:  Kamila Adkins DS Take 1 Tab by mouth two (2) times daily (with meals). Prescriptions Sent to Pharmacy Refills  
 naproxen sodium (ANAPROX DS) 550 mg tablet 2 Sig: Take 1 Tab by mouth two (2) times daily (with meals). Class: Normal  
 Pharmacy: [facility] Ph #: [phone] Route: Oral  
 hydroCHLOROthiazide (HYDRODIURIL) 25 mg tablet 1 Sig: Take 1 Tab by mouth daily. Class: Normal  
 Pharmacy: [facility] [place] [address] Ph #: [phone] Route: Oral  
 losartan (COZAAR) 100 mg tablet 1 Sig: Take 1 Tab by mouth daily. Class: Normal  
 Pharmacy: [address] Ph #: [phone] Route: Oral  
 amLODIPine (NORVASC) 10 mg tablet 1 Sig: Take 1 Tab by mouth daily. Class: Normal  
 Pharmacy: [facility] [address] [place] [address] [address]  #: [phone] Route: Oral  
  
Follow-up Instructions Return in about 1 year (around 6/12/2019). To-Do List   
 06/12/2018 Lab:  CBC WITH AUTOMATED DIFF   
  
 06/12/2018 Lab:  LIPID PANEL   
  
 06/12/2018 Imaging:  PARRISH MAMMO BI SCREENING INCL CAD Introducing [facility]! [facility] introduces An[employer]ot patient portal. Now you can access parts of your medical record, email your doctor's office, and request medication refills online. 1. In your internet browser, go to [email or web address] 2. Click on the First Time User? Click Here link in the Sign In box. You will see the New Member Sign Up page. 3. Enter your Anedot Access Code exactly as it appears below. You will not need to use this code after youve completed the sign-up process. If you do not sign up before the expiration date, you must request a new code. · Anedot Access Code: [email or web address] Expires: 9/3/2018 12:14 PM 
 
4. Enter the last four digits of your Social Security Number (xxxx) and Date of Birth (mm/dd/yyyy) as indicated and click Submit. You will be taken to the next sign-up page. 5. Create a Anedot ID. This will be your Anedot login ID and cannot be changed, so think of one that is secure and easy to remember. 6. Create a An[employer]ot password. You can change your password at any time. 7. Enter your Password Reset Question and Answer. This can be used at a later time if you forget your password. 8. Enter your e-mail address. You will receive e-mail notification when new information is available in [address] 9. Click Sign Up. You can now view and download portions of your medical record.  
10. Click the Download Summary menu link to download a portable copy of your medical information. If you have questions, please visit the Frequently Asked Questions section of the [employer] website. Remember, [email or web address] is NOT to be used for urgent needs. For medical emergencies, dial 911. Now available from your iPhone and Android! Please provide this summary of care documentation to your next provider. Your primary care clinician is listed as Peri Blanks. If you have any questions after today's visit, please call [phone].

## 2018-06-12 NOTE — PROGRESS NOTES
Subjective:     HPI:  Hanna Cabello is a 40 y.o. female who presents to the office for a complete physical.     ED: Pt visited Alex 06/05/2018. Pt reports she had an inflamed fibroid. She will visit Dr. Augie Torres next week. Hypertension  The patient presents for follow up of hypertension. Pt's BP is 180/128 in the office today. Cardiovascular ROS: taking medications as instructed, no medication side effects noted, no TIA's, no chest pain on exertion, no dyspnea on exertion, no swelling of ankles. Current Outpatient Prescriptions   Medication Sig Dispense Refill    naproxen sodium (ANAPROX DS) 550 mg tablet Take 1 Tab by mouth two (2) times daily (with meals). 20 Tab 0    HYDROcodone-acetaminophen (NORCO) 5-325 mg per tablet Take 1 Tab by mouth every six (6) hours as needed for Pain. Max Daily Amount: 4 Tabs. 6 Tab 0    hydroCHLOROthiazide (HYDRODIURIL) 25 mg tablet Take 1 Tab by mouth daily. 90 Tab 1    losartan (COZAAR) 50 mg tablet Take 1 Tab by mouth daily. 90 Tab 1    amLODIPine (NORVASC) 10 mg tablet Take 1 Tab by mouth daily. 90 Tab 1    ibuprofen (MOTRIN) 600 mg tablet Take 1 Tab by mouth every six (6) hours as needed for Pain. 20 Tab 0        Allergies   Allergen Reactions    Lisinopril Cough       Past Medical History:   Diagnosis Date    Anxiety     Elevated blood pressure (not hypertension) 6/10/2014    Hypertension     Rectal bleeding         Past Surgical History:   Procedure Laterality Date    COLONOSCOPY  9/24/2014 Return 9/25/2019    Dr. Tracy Rosenbaum; dx    HX BREAST LUMPECTOMY Right     benign.  HX COLONOSCOPY  age 45    rectal bleeding hemorrhoids found. f/u age 49 or sooner if bleeding.         Family History   Problem Relation Age of Onset    Hypertension Mother     Hypertension Father     Hypertension Other     Diabetes Maternal Grandmother     Stroke Maternal Grandmother     Cancer Maternal Grandmother      Kidney CA    Diabetes Maternal Grandfather     Cancer Maternal Grandfather     Cancer Paternal Grandmother      Breast CA, age 52's   Roula Gunning Breast Cancer Paternal Grandmother     Cancer Other 40       Social History     Social History    Marital status: SINGLE     Spouse name: N/A    Number of children: N/A    Years of education: N/A     Occupational History    Not on file. Social History Main Topics    Smoking status: Never Smoker    Smokeless tobacco: Never Used    Alcohol use 0.0 oz/week      Comment: 1-3 glass per day    Drug use: No    Sexual activity: Yes     Partners: Male     Birth control/ protection: None     Other Topics Concern     Service No    Blood Transfusions No    Weight Concern Yes    Exercise Yes    Seat Belt Yes    Self-Exams No     Social History Narrative       REVIEW OF SYSTEM:  Review of Systems   Constitutional: Negative for chills and fever. Eyes: Negative for blurred vision. Respiratory: Negative for shortness of breath. Cardiovascular: Negative for chest pain, palpitations and leg swelling. Gastrointestinal: Negative for constipation, diarrhea, nausea and vomiting. Musculoskeletal: Negative for joint pain. Neurological: Negative for headaches. Objective:     Visit Vitals    BP (!) 180/128 (BP 1 Location: Right arm, BP Patient Position: Sitting)    Pulse 65    Temp 97.1 °F (36.2 °C) (Oral)    Resp 14    Ht 5' 9\" (1.753 m)    Wt 231 lb 12.8 oz (105.1 kg)    LMP 06/09/2018 (Exact Date)    SpO2 98%    BMI 34.23 kg/m2       PHYSICAL EXAM:  Physical Exam   Constitutional: She is oriented to person, place, and time and well-developed, well-nourished, and in no distress. HENT:   Right Ear: Tympanic membrane, external ear and ear canal normal.   Left Ear: Tympanic membrane, external ear and ear canal normal.   Nose: Nose normal.   Mouth/Throat: Oropharynx is clear and moist.   Eyes: Pupils are equal, round, and reactive to light. Neck: Normal range of motion. Neck supple.  No thyromegaly present. Cardiovascular: Normal rate, regular rhythm, normal heart sounds and intact distal pulses. No murmur heard. Pulmonary/Chest: Effort normal and breath sounds normal. She has no wheezes. Neurological: She is alert and oriented to person, place, and time. GCS score is 15. Skin: Skin is warm and dry. Vitals reviewed. Assessment/Plan:       ICD-10-CM ICD-9-CM    1. Abdominal pain, unspecified abdominal location R10.9 789.00 naproxen sodium (ANAPROX DS) 550 mg tablet   2. Abdominal mass, unspecified abdominal location R19.00 789.30 naproxen sodium (ANAPROX DS) 550 mg tablet   3. Essential hypertension with goal blood pressure less than 140/90 I10 401.9 hydroCHLOROthiazide (HYDRODIURIL) 25 mg tablet      losartan (COZAAR) 50 mg tablet      amLODIPine (NORVASC) 10 mg tablet     Patient given opportunity to ask questions. Questions answered. Patient understands plan of care. Follow-up Disposition: Not on File    Written by Mica Lackey, as dictated by Floyd Degroot DO.    I, Dr. Floyd Degroot, confirm that all documentation is accurate.

## 2018-06-12 NOTE — PROGRESS NOTES
Subjective:   Amari Lloyd is a 40 y.o. female who presents for annual routine Pap and checkup. LMP: Patient's last menstrual period was 2018 (exact date). Last PAP?: 2017   History of abnormal PAP: none  GYN: Presence of fibroids. # lifetime partners: 1  # partners in last year: 1  Abnormal vaginal discharge: none   Family history for GYN cancer: None   Family history breast cancer: Paternal grandmother  from breast cancer in her 63's. Mammogram: Due for mammogram   Family history for colon cancer: Paternal aunt  of colon cancer in her late 42's. Colonoscopy:    Pt states she has a family history of prostate cancer. Weight:     Wt Readings from Last 3 Encounters:   18 231 lb 12.8 oz (105.1 kg)   18 241 lb (109.3 kg)   17 253 lb 9.6 oz (115 kg)     Current Outpatient Prescriptions   Medication Sig Dispense Refill    naproxen sodium (ANAPROX DS) 550 mg tablet Take 1 Tab by mouth two (2) times daily (with meals). 60 Tab 2    hydroCHLOROthiazide (HYDRODIURIL) 25 mg tablet Take 1 Tab by mouth daily. 90 Tab 1    losartan (COZAAR) 100 mg tablet Take 1 Tab by mouth daily. 90 Tab 1    amLODIPine (NORVASC) 10 mg tablet Take 1 Tab by mouth daily. 90 Tab 1    HYDROcodone-acetaminophen (NORCO) 5-325 mg per tablet Take 1 Tab by mouth every six (6) hours as needed for Pain. Max Daily Amount: 4 Tabs. 6 Tab 0    ibuprofen (MOTRIN) 600 mg tablet Take 1 Tab by mouth every six (6) hours as needed for Pain. 20 Tab 0       Allergies   Allergen Reactions    Lisinopril Cough       Past Medical History:   Diagnosis Date    Anxiety     Elevated blood pressure (not hypertension) 6/10/2014    Hypertension     Rectal bleeding        Past Surgical History:   Procedure Laterality Date    COLONOSCOPY  2014 Return 2019    Dr. Karlene Hameed; dx    HX BREAST LUMPECTOMY Right     benign.  HX COLONOSCOPY  age 45    rectal bleeding hemorrhoids found.   f/u age 49 or sooner if bleeding. Family History   Problem Relation Age of Onset    Hypertension Mother     Hypertension Father     Hypertension Other     Diabetes Maternal Grandmother     Stroke Maternal Grandmother     Cancer Maternal Grandmother      Kidney CA    Diabetes Maternal Grandfather     Cancer Maternal Grandfather     Cancer Paternal Grandmother      Breast CA, age 52's    Breast Cancer Paternal Grandmother     Cancer Other 40       Social History     Social History    Marital status: SINGLE     Spouse name: N/A    Number of children: N/A    Years of education: N/A     Occupational History    Not on file. Social History Main Topics    Smoking status: Never Smoker    Smokeless tobacco: Never Used    Alcohol use 0.0 oz/week      Comment: 1-3 glass per day    Drug use: No    Sexual activity: Yes     Partners: Male     Birth control/ protection: None     Other Topics Concern     Service No    Blood Transfusions No    Weight Concern Yes    Exercise Yes    Seat Belt Yes    Self-Exams No     Social History Narrative       Review of Systems   Constitutional: Negative for chills and fever. Eyes: Negative for blurred vision. Respiratory: Negative for shortness of breath. Cardiovascular: Negative for chest pain, palpitations and leg swelling. Gastrointestinal: Positive for abdominal pain. Negative for constipation, diarrhea, nausea and vomiting. Musculoskeletal: Negative for joint pain. Neurological: Negative for headaches.        Objective:     Visit Vitals    BP (!) 180/128 (BP 1 Location: Right arm, BP Patient Position: Sitting)    Pulse 65    Temp 97.1 °F (36.2 °C) (Oral)    Resp 14    Ht 5' 9\" (1.753 m)    Wt 231 lb 12.8 oz (105.1 kg)    LMP 06/09/2018 (Exact Date)    SpO2 98%    BMI 34.23 kg/m2        Hearing Screening    125Hz 250Hz 500Hz 1000Hz 2000Hz 3000Hz 4000Hz 6000Hz 8000Hz   Right ear:   Pass Pass Pass  Pass     Left ear:   Pass Pass Pass  Pass Visual Acuity Screening    Right eye Left eye Both eyes   Without correction: 20/15 20/15 20/15   With correction:          Physical Exam   Constitutional: She is oriented to person, place, and time and well-developed, well-nourished, and in no distress. HENT:   Right Ear: Tympanic membrane, external ear and ear canal normal.   Left Ear: Tympanic membrane, external ear and ear canal normal.   Nose: Nose normal.   Mouth/Throat: Oropharynx is clear and moist.   Eyes: Pupils are equal, round, and reactive to light. Neck: Normal range of motion. Neck supple. No thyromegaly present. Cardiovascular: Normal rate, regular rhythm, normal heart sounds and intact distal pulses. No murmur heard. Pulmonary/Chest: Effort normal and breath sounds normal. She has no wheezes. Abdominal:   Abdominal mass in LLQ and left periumbilical.    Neurological: She is alert and oriented to person, place, and time. GCS score is 15. Skin: Skin is warm and dry. Vitals reviewed. Pelvic exam: Pt has appointment with Dr. Elvira Carias 06/18/2018. Assessment/Plan:   well woman  mammogram  return annually or prn    ICD-10-CM ICD-9-CM    1. Essential hypertension I10 401.9 hydroCHLOROthiazide (HYDRODIURIL) 25 mg tablet      losartan (COZAAR) 100 mg tablet      amLODIPine (NORVASC) 10 mg tablet   2. Menorrhagia with irregular cycle N92.1 626.2 CBC WITH AUTOMATED DIFF      CBC WITH AUTOMATED DIFF   3. Dysmenorrhea N94.6 625.3 naproxen sodium (ANAPROX DS) 550 mg tablet   4. Screening cholesterol level Z13.220 V77.91 LIPID PANEL      LIPID PANEL   5. Screening for breast cancer Z12.31 V76.10 PARRISH MAMMO BI SCREENING INCL CAD   . Patient given opportunity to ask questions. Questions answered. Patient understands plan of care. Follow-up Disposition:  Return in about 1 year (around 6/12/2019).     Written by Tish Crawford, as dictated by Byron Waterman DO.    I, Dr. Byron Waterman, confirm that all documentation is accurate.

## 2018-06-13 DIAGNOSIS — N94.6 DYSMENORRHEA: ICD-10-CM

## 2018-06-13 LAB
ABSOLUTE LYMPHOCYTE COUNT, 10803: 1.9 K/UL (ref 1–4.8)
BASOPHILS # BLD: 0 K/UL (ref 0–0.2)
BASOPHILS NFR BLD: 1 % (ref 0–2)
CHOLEST SERPL-MCNC: 178 MG/DL (ref 110–200)
EOSINOPHIL # BLD: 0.1 K/UL (ref 0–0.5)
EOSINOPHIL NFR BLD: 2 % (ref 0–6)
ERYTHROCYTE [DISTWIDTH] IN BLOOD BY AUTOMATED COUNT: 16.9 % (ref 10–15.5)
GRANULOCYTES,GRANS: 52 % (ref 40–75)
HCT VFR BLD AUTO: 39.9 % (ref 35.1–46.5)
HDLC SERPL-MCNC: 2.3 MG/DL (ref 0–5)
HDLC SERPL-MCNC: 78 MG/DL (ref 40–59)
HGB BLD-MCNC: 12.7 G/DL (ref 11.7–15.5)
LDLC SERPL CALC-MCNC: 87 MG/DL (ref 50–99)
LYMPHOCYTES, LYMLT: 35 % (ref 20–45)
MCH RBC QN AUTO: 28 PG (ref 26–34)
MCHC RBC AUTO-ENTMCNC: 32 G/DL (ref 31–36)
MCV RBC AUTO: 87 FL (ref 80–95)
MONOCYTES # BLD: 0.6 K/UL (ref 0.1–1)
MONOCYTES NFR BLD: 10 % (ref 3–12)
NEUTROPHILS # BLD AUTO: 2.8 K/UL (ref 1.8–7.7)
PLATELET # BLD AUTO: 350 K/UL (ref 140–440)
PMV BLD AUTO: 12.3 FL (ref 9–13)
RBC # BLD AUTO: 4.57 M/UL (ref 3.8–5.2)
TRIGL SERPL-MCNC: 65 MG/DL (ref 40–149)
VLDLC SERPL CALC-MCNC: 13 MG/DL (ref 8–30)
WBC # BLD AUTO: 5.4 K/UL (ref 4–11)

## 2018-06-13 RX ORDER — NAPROXEN SODIUM 550 MG/1
TABLET ORAL
Qty: 180 TAB | Refills: 2 | Status: SHIPPED | OUTPATIENT
Start: 2018-06-13

## 2018-06-19 ENCOUNTER — OFFICE VISIT (OUTPATIENT)
Dept: OBGYN CLINIC | Age: 43
End: 2018-06-19

## 2018-06-19 VITALS
HEIGHT: 69 IN | HEART RATE: 97 BPM | BODY MASS INDEX: 34.66 KG/M2 | DIASTOLIC BLOOD PRESSURE: 135 MMHG | SYSTOLIC BLOOD PRESSURE: 173 MMHG | WEIGHT: 234 LBS

## 2018-06-19 DIAGNOSIS — N92.1 MENORRHAGIA WITH IRREGULAR CYCLE: Primary | ICD-10-CM

## 2018-06-19 DIAGNOSIS — N92.1 MENORRHAGIA WITH IRREGULAR CYCLE: ICD-10-CM

## 2018-06-19 DIAGNOSIS — D25.9 UTERINE LEIOMYOMA, UNSPECIFIED LOCATION: ICD-10-CM

## 2018-06-19 RX ORDER — ACETAMINOPHEN AND CODEINE PHOSPHATE 120; 12 MG/5ML; MG/5ML
SOLUTION ORAL
Qty: 84 TAB | Refills: 11 | Status: SHIPPED | OUTPATIENT
Start: 2018-06-19 | End: 2018-08-28 | Stop reason: ALTCHOICE

## 2018-06-19 RX ORDER — ACETAMINOPHEN AND CODEINE PHOSPHATE 120; 12 MG/5ML; MG/5ML
1 SOLUTION ORAL DAILY
Qty: 1 PACKAGE | Refills: 11 | Status: SHIPPED | OUTPATIENT
Start: 2018-06-19 | End: 2018-06-19 | Stop reason: SDUPTHER

## 2018-06-20 ENCOUNTER — TELEPHONE (OUTPATIENT)
Dept: FAMILY MEDICINE CLINIC | Age: 43
End: 2018-06-20

## 2018-06-20 NOTE — TELEPHONE ENCOUNTER
Pt. Stated that Dr. Kassi Dunham did not send her prescription for Xanax. She is asking to be called back.  Please assist.

## 2018-06-25 ENCOUNTER — OFFICE VISIT (OUTPATIENT)
Dept: FAMILY MEDICINE CLINIC | Age: 43
End: 2018-06-25

## 2018-06-25 ENCOUNTER — HOSPITAL ENCOUNTER (OUTPATIENT)
Dept: ULTRASOUND IMAGING | Age: 43
Discharge: HOME | End: 2018-06-25
Attending: OBSTETRICS & GYNECOLOGY
Payer: COMMERCIAL

## 2018-06-25 VITALS
BODY MASS INDEX: 34.87 KG/M2 | SYSTOLIC BLOOD PRESSURE: 132 MMHG | HEIGHT: 69 IN | RESPIRATION RATE: 16 BRPM | WEIGHT: 235.4 LBS | HEART RATE: 105 BPM | OXYGEN SATURATION: 98 % | DIASTOLIC BLOOD PRESSURE: 94 MMHG | TEMPERATURE: 97 F

## 2018-06-25 DIAGNOSIS — I10 ESSENTIAL HYPERTENSION: Primary | ICD-10-CM

## 2018-06-25 DIAGNOSIS — F41.9 ANXIETY: ICD-10-CM

## 2018-06-25 DIAGNOSIS — N92.1 MENORRHAGIA WITH IRREGULAR CYCLE: ICD-10-CM

## 2018-06-25 DIAGNOSIS — F40.243 PHOBIA, FLYING: ICD-10-CM

## 2018-06-25 PROCEDURE — 76830 TRANSVAGINAL US NON-OB: CPT

## 2018-06-25 RX ORDER — ALPRAZOLAM 2 MG/1
2 TABLET ORAL 2 TIMES DAILY
Qty: 15 TAB | Refills: 0 | Status: SHIPPED | OUTPATIENT
Start: 2018-06-25 | End: 2018-08-28 | Stop reason: SDUPTHER

## 2018-06-25 NOTE — PROGRESS NOTES
Subjective:     HPI:  Eugene Maurer is a 40 y.o. female who presents to the office for anxiety medication refills. GYN: Pt reports she has started oral contraceptive pills per ob/gyn,  Dr. Anirudh Horton. Pt states she was told she may have a open total abdominal  hysterectomy due to fibroids. She reports \"feeling pregnant,\" and elongated menstruation as symptoms she experiences. Pt states she is scheduled for an ultrasound later today. Pt is concerned about the healing process and scars. GI:Family history of colorectal cancer. Last colonoscopy 09/23/2014, follow-up in 5 years   due to rectal bleeding she was experiencing. Anxiety: Pt has a phobia of flying, she states she will be traveling to [place] in August to visit with her son and granddaughter. Pt's last Xanax prescription was 06/2017. She states she takes xanax an hour before her first flight which is effective through her lay over and next flight. Hypertension  The patient presents for follow up of hypertension. Pt's BP is 132/94 n the office today. Cardiovascular ROS: taking medications as instructed, no medication side effects noted, no TIA's, no chest pain on exertion, no dyspnea on exertion, no swelling of ankles. Labs reviewed:   Lab Results   Component Value Date/Time    Cholesterol, total 178 06/12/2018 12:29 PM    HDL Cholesterol 78 (H) 06/12/2018 12:29 PM    LDL, calculated 87 06/12/2018 12:29 PM    VLDL, calculated 13 06/12/2018 12:29 PM    Triglyceride 65 06/12/2018 12:29 PM     Lab Results   Component Value Date/Time    WBC 5.4 06/12/2018 12:29 PM    HGB 12.7 06/12/2018 12:29 PM    HCT 39.9 06/12/2018 12:29 PM    PLATELET 333 43/97/3972 12:29 PM    MCV 87 06/12/2018 12:29 PM     Current Outpatient Prescriptions   Medication Sig Dispense Refill    ALPRAZolam (XANAX) 2 mg tablet Take 1 Tab by mouth two (2) times a day. Max Daily Amount: 4 mg.  Indications: anxiety, Generalized Anxiety Disorder, Panic Disorder 15 Tab 0    norethindrone (MICRONOR) 0.35 mg tab TAKE 1 TABLET BY MOUTH DAILY 84 Tab 11    naproxen sodium (ANAPROX) 550 mg tablet TAKE 1 TABLET BY MOUTH TWICE DAILY WITH MEALS 180 Tab 2    hydroCHLOROthiazide (HYDRODIURIL) 25 mg tablet Take 1 Tab by mouth daily. 90 Tab 1    losartan (COZAAR) 100 mg tablet Take 1 Tab by mouth daily. 90 Tab 1    amLODIPine (NORVASC) 10 mg tablet Take 1 Tab by mouth daily. 90 Tab 1        Allergies   Allergen Reactions    Lisinopril Cough       Past Medical History:   Diagnosis Date    Anxiety     Elevated blood pressure (not hypertension) 6/10/2014    Hypertension     Rectal bleeding         Past Surgical History:   Procedure Laterality Date    COLONOSCOPY  9/24/2014 Return 9/25/2019    Dr. Spencer Rubin; dx    HX BREAST LUMPECTOMY Right     benign.  HX COLONOSCOPY  age 45    rectal bleeding hemorrhoids found. f/u age 49 or sooner if bleeding. Family History   Problem Relation Age of Onset    Hypertension Mother     Hypertension Father     Hypertension Other     Diabetes Maternal Grandmother     Stroke Maternal Grandmother     Cancer Maternal Grandmother      Kidney CA    Diabetes Maternal Grandfather     Cancer Maternal Grandfather     Cancer Paternal Grandmother      Breast CA, age 52's    Breast Cancer Paternal Grandmother     Cancer Other 39       Social History     Social History    Marital status: SINGLE     Spouse name: N/A    Number of children: N/A    Years of education: N/A     Occupational History    Not on file.      Social History Main Topics    Smoking status: Never Smoker    Smokeless tobacco: Never Used    Alcohol use 0.0 oz/week      Comment: 1-3 glass per day    Drug use: No    Sexual activity: Yes     Partners: Male     Birth control/ protection: None     Other Topics Concern     Service No    Blood Transfusions No    Weight Concern Yes    Exercise Yes    Seat Belt Yes    Self-Exams No     Social History Narrative REVIEW OF SYSTEM:  Review of Systems   Constitutional: Negative for chills and fever. Eyes: Negative for blurred vision. Respiratory: Negative for shortness of breath. Cardiovascular: Negative for chest pain, palpitations and leg swelling. Gastrointestinal: Negative for constipation, diarrhea, nausea and vomiting. Musculoskeletal: Negative for joint pain. Neurological: Negative for headaches. Objective:     Visit Vitals    BP (!) 132/94 (BP 1 Location: Right arm, BP Patient Position: Sitting)    Pulse (!) 105    Temp 97 °F (36.1 °C) (Oral)    Resp 16    Ht 5' 9\" (1.753 m)    Wt 235 lb 6.4 oz (106.8 kg)    LMP 06/09/2018 (Exact Date)    SpO2 98%    BMI 34.76 kg/m2       PHYSICAL EXAM:  Physical Exam   Constitutional: She is oriented to person, place, and time and well-developed, well-nourished, and in no distress. HENT:   Right Ear: Tympanic membrane, external ear and ear canal normal.   Left Ear: Tympanic membrane, external ear and ear canal normal.   Nose: Nose normal.   Mouth/Throat: Oropharynx is clear and moist.   Eyes: Pupils are equal, round, and reactive to light. Neck: Normal range of motion. Neck supple. No thyromegaly present. Cardiovascular: Normal rate, regular rhythm, normal heart sounds and intact distal pulses. No murmur heard. Pulmonary/Chest: Effort normal and breath sounds normal. She has no wheezes. Neurological: She is alert and oriented to person, place, and time. GCS score is 15. Skin: Skin is warm and dry. Vitals reviewed. Assessment/Plan:       ICD-10-CM ICD-9-CM    1. Essential hypertension I10 401.9    2. Anxiety F41.9 300.00 ALPRAZolam (XANAX) 2 mg tablet   3. Phobia, flying F40.243 300.29 ALPRAZolam (XANAX) 2 mg tablet     Patient given opportunity to ask questions. Questions answered. Blood pressure is improved today from last visit.   Still not at goal.   Patient interval of follow up shortened due to poor blood pressure control at last office visit. Patient understands plan of care. Follow-up Disposition:  Return in about 3 months (around 9/25/2018). Written by Grayson Mora, as dictated by DO. CHALINO Fishman, Dr. Veronica Graves, confirm that all documentation is accurate.

## 2018-06-25 NOTE — PROGRESS NOTES
Yumiko Cavazos is a 40 y.o. female presented to clinic for anxiety medication refill. Pt c/o 3/10 gyn cyst. Pt denies binging bottle for confirmation. 1. Have you been to the ER, urgent care clinic since your last visit? Hospitalized since your last visit? No    2. Have you seen or consulted any other health care providers outside of the [address] [place] [address] since your last visit? Include any pap smears or colon screening.  No     Learning Assessment 3/5/2015   PRIMARY LEARNER Patient   PRIMARY LANGUAGE ENGLISH   LEARNER PREFERENCE PRIMARY DEMONSTRATION   ANSWERED BY patient   RELATIONSHIP SELF     Health Maintenance Due   Topic Date Due    DTaP/Tdap/Td series (1 - Tdap) 09/08/1997    BREAST CANCER SCRN MAMMOGRAM  06/22/2017

## 2018-06-25 NOTE — MR AVS SNAPSHOT
Yanelis Olmstead 
 
 
 [address] [facility] [address] [place] [phone] 
[phone] Patient: Jenn Carlos MRN: [id number] MBW:[id number] Visit Information Date & Time Provider Department Dept. Phone Encounter #  
 6/25/2018  8:20 AM Fam Nixon [address] [place] [id number] Follow-up Instructions Return in about 3 months (around 9/25/2018). Your Appointments 7/3/2018 11:45 AM  
Office Visit with Gelacio Cerda MD  
[facility] ([facility] Appt Note: test results [place] [id number] [phone]  
  
   
 [place] [phone]  
  
    
 7/17/2018 12:40 PM  
[place] [address] [address] Appt Note: 1 month f/u  
 [id number] [facility] [address] [place] [address] 83 [phone] [facility]  
  
   
 [address] [facility] [address] [place] [address] [address] Upcoming Health Maintenance Date Due DTaP/Tdap/Td series (1 - Tdap) 9/8/1997 BREAST CANCER SCRN MAMMOGRAM 6/22/2017 Influenza Age 5 to Adult 8/1/2018 COLONOSCOPY 9/23/2019 PAP AKA CERVICAL CYTOLOGY 6/19/2020 Allergies as of 6/25/2018  Review Complete On: 6/19/2018 By: Gelacio Cerda MD  
  
 Severity Noted Reaction Type Reactions Lisinopril  06/22/2015    Cough Current Immunizations  Never Reviewed No immunizations on file. Not reviewed this visit You Were Diagnosed With   
  
 Codes Comments Essential hypertension    -  Primary ICD-10-CM: I10 
ICD-9-CM: 401.9 Anxiety     ICD-10-CM: F41.9 ICD-9-CM: 300.00 Phobia, flying     ICD-10-CM: L19.696 ICD-9-CM: 300.29 Vitals BP Pulse Temp Resp Height(growth percentile) Weight(growth percentile) (!) 132/94 (BP 1 Location: Right arm, BP Patient Position: Sitting) (!) 105 97 °F (36.1 °C) (Oral) 16 5' 9\" (1.753 m) 235 lb 6.4 oz (106.8 kg) LMP SpO2 BMI OB Status Smoking Status 06/09/2018 (Exact Date) 98% 34.76 kg/m2 Having regular periods Never Smoker BMI and BSA Data Body Mass Index Body Surface Area 34.76 kg/m 2 2.28 m 2 Preferred Pharmacy Pharmacy Name Phone [facility] [address] [place] [address], [address] [place], [place] [address]Akbar Mccoy 136 [phone] Your Updated Medication List  
  
   
This list is accurate as of 6/25/18  9:04 AM.  Always use your most recent med list.  
  
  
  
  
 ALPRAZolam 2 mg tablet Commonly known as:  Trula Crick Take 1 Tab by mouth two (2) times a day. Max Daily Amount: 4 mg. Indications: anxiety, Generalized Anxiety Disorder, Panic Disorder  
  
 amLODIPine 10 mg tablet Commonly known as:  Arthur [place] Take 1 Tab by mouth daily. hydroCHLOROthiazide 25 mg tablet Commonly known as:  HYDRODIURIL Take 1 Tab by mouth daily. losartan 100 mg tablet Commonly known as:  COZAAR Take 1 Tab by mouth daily. naproxen sodium 550 mg tablet Commonly known as:  Urban Harsh TAKE 1 TABLET BY MOUTH TWICE DAILY WITH MEALS  
  
 norethindrone 0.35 mg Tab Commonly known as:  MICRONOR  
TAKE 1 TABLET BY MOUTH DAILY Prescriptions Printed Refills ALPRAZolam (XANAX) 2 mg tablet 0 Sig: Take 1 Tab by mouth two (2) times a day. Max Daily Amount: 4 mg. Indications: anxiety, Generalized Anxiety Disorder, Panic Disorder Class: Print Route: Oral  
  
Follow-up Instructions Return in about 3 months (around 9/25/2018). To-Do List   
 06/25/2018 1:00 PM  
  Appointment with [address] [place] [address] ([phone]) OUTSIDE FILMS  - Any outside films related to the study being scheduled should be brought with you on the day of the exam.  If this cannot be done there may be a delay in the reading of the study.   MEDICATIONS  - Patient must bring a complete list of all medications currently taking to include prescriptions, over-the-counter meds, herbals, vitamins & any dietary supplements  GENERAL -Patient must drink 32 ounces of water 1 hour prior to the exam.  
  
  
Introducing [facility]! Anushka Sanchez introduces [facility] patient portal. Now you can access parts of your medical record, email your doctor's office, and request medication refills online. 1. In your internet browser, go to [email or web address] 2. Click on the First Time User? Click Here link in the Sign In box. You will see the New Member Sign Up page. 3. Enter your [employer] Access Code exactly as it appears below. You will not need to use this code after youve completed the sign-up process. If you do not sign up before the expiration date, you must request a new code. · [employer] Access Code: [email or web address] Expires: 9/3/2018 12:14 PM 
 
4. Enter the last four digits of your Social Security Number (xxxx) and Date of Birth (mm/dd/yyyy) as indicated and click Submit. You will be taken to the next sign-up page. 5. Create a [facility] ID. This will be your [facility] login ID and cannot be changed, so think of one that is secure and easy to remember. 6. Create a [facility] [employer] password. You can change your password at any time. 7. Enter your Password Reset Question and Answer. This can be used at a later time if you forget your password. 8. Enter your e-mail address. You will receive e-mail notification when new information is available in [address] 9. Click Sign Up. You can now view and download portions of your medical record. 10. Click the Download Summary menu link to download a portable copy of your medical information. If you have questions, please visit the Frequently Asked Questions section of the [employer] website. Remember, [facility] is NOT to be used for urgent needs. For medical emergencies, dial 911. Now available from your iPhone and Android! Please provide this summary of care documentation to your next provider. Your primary care clinician is listed as Prince Kelley. If you have any questions after today's visit, please call [phone].

## 2018-07-03 ENCOUNTER — OFFICE VISIT (OUTPATIENT)
Dept: OBGYN CLINIC | Age: 43
End: 2018-07-03

## 2018-07-03 VITALS
WEIGHT: 235 LBS | HEART RATE: 102 BPM | BODY MASS INDEX: 34.8 KG/M2 | DIASTOLIC BLOOD PRESSURE: 115 MMHG | SYSTOLIC BLOOD PRESSURE: 147 MMHG | HEIGHT: 69 IN

## 2018-07-03 DIAGNOSIS — D25.9 UTERINE LEIOMYOMA, UNSPECIFIED LOCATION: Primary | ICD-10-CM

## 2018-07-04 NOTE — PROGRESS NOTES
39 y/o with menorrhagia, large fibroids and dysmenorrhea presents to the office to discuss her results. She was seen several weeks ago and sent for ultrasound. She was very upset after the last visit and is here with her sister friend. Visit Vitals    BP (!) 147/115    Pulse (!) 102    Ht 5' 9\" (1.753 m)    Wt 235 lb (106.6 kg)    LMP 06/09/2018 (Exact Date)    BMI 34.7 kg/m2     Gen: A&ox3, NAD  Abdomen: 24 week sized uterus    Ultrasound: Transvaginal examination severely limited due to uterus size and overlying bowel  gas.     Uterus: The uterus is anteverted and measures 20 x 13.8 x 10.2 cm. Large  heterogeneous fibroid uterus, as described below. -3.9 x 4.2 x 4 cm intramural hypoechoic partially peripherally calcified mass at  the right uterine fundus.  -7.8 x 7.2 x 5.6 cm intramural hypoechoic mass at the left uterine fundus. -8.8 x 9.1 x 6.7 cm subserosal hypoechoic exophytic mass arising from the  uterine fundus.  -7 x 6.6 x 6.1 cm intramural hypoechoic mass arising from the posterior right  uterine fundus. -Multiple nabothian cysts noted in the cervix.  -Endometrial complex not well identified secondary to multiple uterine masses.     Right ovary and adnexa: The right ovary measures 3.7 x 3.7 x 3.3 cm. Color-flow  and spectral Doppler demonstrated normal vascular flow in the right ovary. No  dominant solid mass identified. 2.2 cm dominant follicle.     Left ovary and adnexa: Not visualized due to overlying bowel gas and uterine  size.     Other: Small amount of free fluid in the cul-de-sac. A/P:   Symptomatic large fibroids. Long discussion about surgical options. Pt. Has now decided she isn't interested in future conception. Noted elevated b/p- will need pre-op clearance and EMB. Due to the size discussed necessity for RICHARD vs minimally invasive approach. Pt. Has interest in possible abdominoplasty at the same time. 20 minutes spent discussing.   Pre-op clearance form given to the patient. Will need EMB.

## 2018-07-04 NOTE — PATIENT INSTRUCTIONS
Endometrial Biopsy: About This Test  What is it? An endometrial biopsy is a way for your doctor to take a small sample of the lining of the uterus (endometrium). The sample is looked at under a microscope for abnormal cells. An endometrial biopsy helps your doctor find problems in the endometrium. Why is this test done? An endometrial biopsy is done to check for cancer of the uterus. The test is also done if you have abnormal bleeding from your uterus or are having problems getting pregnant. The test results show how your body's hormones are affecting the lining of the uterus. How can you prepare for the test?  Talk to your doctor about all your health conditions before the test. For example, tell your doctor if you:  · Are or might be pregnant. An endometrial biopsy is not done during pregnancy. · Are taking any medicines. · Are allergic to any medicines. · Have had bleeding problems, or if you take aspirin or some other blood thinner. · Have been treated for an infection in your pelvic area. · Have any heart or lung problems. Other ways to prepare:  · Do not douche, use tampons, or use vaginal medicines for 24 hours before the test.  · Ask your doctor if you should take a pain reliever, such as ibuprofen (Advil or Motrin), 30 to 60 minutes before the test. This can help reduce any cramping pain that the test can cause. · Talk to your doctor if you have concerns about the need for the test, its risks, how it will be done, or what the results may mean. What happens before the test?  · You will empty your bladder just before the test.  · You will be asked to sign a consent form that says you understand the risks of the test and agree to have it done. What happens during the test?  · You will lie on an exam table. Your feet will be in stirrups. · The doctor may use a spray or injection to numb your cervix. The cervix is the opening to the uterus.   · The doctor will use a tool called a speculum to see the cervix. · Then the doctor will pass a thin tube through the cervix to take a sample of the uterus lining. You may feel a sharp cramp when the doctor collects the sample. · The sample is sent to a lab. How long does the test take? The test will take about 5 to 15 minutes. What happens after the test?  · You will probably be able to go home right away. · You likely will have mild vaginal bleeding and may have cramps for a few days after the test. The cramps may feel like bad menstrual cramps. · Ask your doctor if you can take an over-the-counter pain medicine, such as acetaminophen (Tylenol), ibuprofen (Advil, Motrin), or naproxen (Aleve). Be safe with medicines. Read and follow all instructions on the label. · Do not have sex, use tampons, or douche until the spotting stops. Use pads for vaginal bleeding or discharge. · Do not do strenuous exercise or heavy lifting for one day after your biopsy. Follow-up care is a key part of your treatment and safety. Be sure to make and go to all appointments, and call your doctor if you are having problems. It's also a good idea to keep a list of the medicines you take. Ask your doctor when you can expect to have your test results. Where can you learn more? Go to http://latoya-david.info/. Enter [phone] in the search box to learn more about \"Endometrial Biopsy: About This Test.\"  Current as of: October 13, 2016  Content Version: 11.4  © 0291-9305 [employer]. Care instructions adapted under license by [employer] (which disclaims liability or warranty for this information). If you have questions about a medical condition or this instruction, always ask your healthcare professional. [address] any warranty or liability for your use of this information.

## 2018-07-26 DIAGNOSIS — Z12.39 SCREENING FOR BREAST CANCER: ICD-10-CM

## 2018-08-09 NOTE — H&P
[facility]  HISTORY AND PHYSICAL      Jamaica Blevins  MR#: [id number]  : 1975  ACCOUNT #: [de-identified]   ADMIT DATE: 2018    PLANNED PROCEDURES:  Laparotomy with total abdominal hysterectomy, removal of uterus, cervix and tubes. DIAGNOSES:  Very large uterine fibroids, 25-30 weeks in size, pelvic pain, and bleeding. HISTORY OF PRESENT ILLNESS:  This is a 81-year-old black female,  5, para 2, two living children, who came to me with huge uterine fibroids. We talked about minimally invasive options, but they are so large and she is not overly anxious about trying minimally invasive, so the plan is for laparotomy, removal of uterus, cervix, tubes and ovaries. The small chances of damage to bowel, bladder, or other visceral structures, hematologic, infectious and anesthetic complications were made clear and agreed. PAST MEDICAL HISTORY:  Other than the 2 deliveries, she had a breast biopsy. She does not have any other particular medical problems, except some hypertension. MEDICATIONS:  She is on losartan as well as hydrochlorothiazide. FAMILY HISTORY:  Nothing significant in first-degree relatives. SOCIAL HISTORY:  Denies smoking or heavy alcohol use. ALLERGIES:  LISINOPRIL CAUSES A COUGH. REVIEW OF SYSTEMS:  Normal other than the above. PHYSICAL EXAMINATION:  GENERAL:  Oriented x3. Weight 238. VITAL SIGNS:  Blood pressure 145/98. HEENT:  Normal.  NECK:  Thyroid normal.  CHEST:  Clear to auscultation and percussion. ABDOMEN:  Benign. PELVIC:  Benign. Pap is up to date. PELVIC:  The bimanual reveals the uterus seemingly about 25-30 weeks in size. Ultrasounds, etc. have already been done. PLAN:  Laparotomy and total abdominal hysterectomy.       Lavern Roque MD dictating on behalf of MD LOU Smyth/DAMON  D: 2018 16:53     T: 2018 17:16  JOB #: [id number]  CC: Loida Stephen DO

## 2018-08-14 RX ORDER — SODIUM CHLORIDE 0.9 % (FLUSH) 0.9 %
5-10 SYRINGE (ML) INJECTION AS NEEDED
Status: CANCELLED | OUTPATIENT
Start: 2018-08-14

## 2018-08-14 RX ORDER — SODIUM CHLORIDE, SODIUM LACTATE, POTASSIUM CHLORIDE, CALCIUM CHLORIDE 600; 310; 30; 20 MG/100ML; MG/100ML; MG/100ML; MG/100ML
150 INJECTION, SOLUTION INTRAVENOUS CONTINUOUS
Status: CANCELLED | OUTPATIENT
Start: 2018-08-14

## 2018-08-14 RX ORDER — SODIUM CHLORIDE 0.9 % (FLUSH) 0.9 %
5-10 SYRINGE (ML) INJECTION EVERY 8 HOURS
Status: CANCELLED | OUTPATIENT
Start: 2018-08-14

## 2018-08-15 ENCOUNTER — HOSPITAL ENCOUNTER (OUTPATIENT)
Dept: LAB | Age: 43
Discharge: HOME | End: 2018-08-15
Payer: COMMERCIAL

## 2018-08-15 ENCOUNTER — HOSPITAL ENCOUNTER (OUTPATIENT)
Dept: LAB | Age: 43
Discharge: HOME | End: 2018-08-15

## 2018-08-15 ENCOUNTER — HOSPITAL ENCOUNTER (OUTPATIENT)
Dept: PREADMISSION TESTING | Age: 43
Discharge: HOME | End: 2018-08-15
Payer: COMMERCIAL

## 2018-08-15 DIAGNOSIS — Z01.818 PRE-OP TESTING: ICD-10-CM

## 2018-08-15 LAB
ABO + RH BLD: NORMAL
ATRIAL RATE: 71 BPM
BLOOD GROUP ANTIBODIES SERPL: NORMAL
CALCULATED P AXIS, ECG09: 53 DEGREES
CALCULATED R AXIS, ECG10: 24 DEGREES
CALCULATED T AXIS, ECG11: 40 DEGREES
DIAGNOSIS, 93000: NORMAL
P-R INTERVAL, ECG05: 204 MS
Q-T INTERVAL, ECG07: 448 MS
QRS DURATION, ECG06: 94 MS
QTC CALCULATION (BEZET), ECG08: 486 MS
SENTARA SPECIMEN COL,SENBCF: NORMAL
SPECIMEN EXP DATE BLD: NORMAL
VENTRICULAR RATE, ECG03: 71 BPM

## 2018-08-15 PROCEDURE — 93005 ELECTROCARDIOGRAM TRACING: CPT

## 2018-08-15 PROCEDURE — 86900 BLOOD TYPING SEROLOGIC ABO: CPT | Performed by: OBSTETRICS & GYNECOLOGY

## 2018-08-15 PROCEDURE — 99001 SPECIMEN HANDLING PT-LAB: CPT | Performed by: OBSTETRICS & GYNECOLOGY

## 2018-08-17 ENCOUNTER — ANESTHESIA EVENT (OUTPATIENT)
Dept: SURGERY | Age: 43
DRG: 743 | End: 2018-08-17
Payer: COMMERCIAL

## 2018-08-20 ENCOUNTER — ANESTHESIA (OUTPATIENT)
Dept: SURGERY | Age: 43
DRG: 743 | End: 2018-08-20
Payer: COMMERCIAL

## 2018-08-20 ENCOUNTER — HOSPITAL ENCOUNTER (INPATIENT)
Age: 43
LOS: 2 days | Discharge: HOME | DRG: 743 | End: 2018-08-22
Attending: OBSTETRICS & GYNECOLOGY | Admitting: OBSTETRICS & GYNECOLOGY
Payer: COMMERCIAL

## 2018-08-20 PROBLEM — D21.9 FIBROIDS: Status: ACTIVE | Noted: 2018-08-20

## 2018-08-20 LAB
BASOPHILS # BLD: 0 K/UL (ref 0–0.1)
BASOPHILS # BLD: 0.1 K/UL (ref 0–0.1)
BASOPHILS NFR BLD: 0 % (ref 0–2)
BASOPHILS NFR BLD: 1 % (ref 0–2)
DIFFERENTIAL METHOD BLD: ABNORMAL
DIFFERENTIAL METHOD BLD: ABNORMAL
EOSINOPHIL # BLD: 0 K/UL (ref 0–0.4)
EOSINOPHIL # BLD: 0.1 K/UL (ref 0–0.4)
EOSINOPHIL NFR BLD: 0 % (ref 0–5)
EOSINOPHIL NFR BLD: 2 % (ref 0–5)
ERYTHROCYTE [DISTWIDTH] IN BLOOD BY AUTOMATED COUNT: 16.8 % (ref 11.6–14.5)
ERYTHROCYTE [DISTWIDTH] IN BLOOD BY AUTOMATED COUNT: 16.9 % (ref 11.6–14.5)
HCG SERPL QL: NEGATIVE
HCG SERPL-ACNC: <1 MIU/ML (ref 0–10)
HCG UR QL: NEGATIVE
HCT VFR BLD AUTO: 36.7 % (ref 35–45)
HCT VFR BLD AUTO: 37.6 % (ref 35–45)
HGB BLD-MCNC: 12.3 G/DL (ref 12–16)
HGB BLD-MCNC: 12.6 G/DL (ref 12–16)
LYMPHOCYTES # BLD: 0.8 K/UL (ref 0.9–3.6)
LYMPHOCYTES # BLD: 1.5 K/UL (ref 0.9–3.6)
LYMPHOCYTES NFR BLD: 25 % (ref 21–52)
LYMPHOCYTES NFR BLD: 5 % (ref 21–52)
MCH RBC QN AUTO: 27.8 PG (ref 24–34)
MCH RBC QN AUTO: 27.9 PG (ref 24–34)
MCHC RBC AUTO-ENTMCNC: 33.5 G/DL (ref 31–37)
MCHC RBC AUTO-ENTMCNC: 33.5 G/DL (ref 31–37)
MCV RBC AUTO: 83 FL (ref 74–97)
MCV RBC AUTO: 83.4 FL (ref 74–97)
MONOCYTES # BLD: 0.3 K/UL (ref 0.05–1.2)
MONOCYTES # BLD: 0.4 K/UL (ref 0.05–1.2)
MONOCYTES NFR BLD: 2 % (ref 3–10)
MONOCYTES NFR BLD: 7 % (ref 3–10)
NEUTS SEG # BLD: 14.2 K/UL (ref 1.8–8)
NEUTS SEG # BLD: 4 K/UL (ref 1.8–8)
NEUTS SEG NFR BLD: 65 % (ref 40–73)
NEUTS SEG NFR BLD: 93 % (ref 40–73)
PLATELET # BLD AUTO: 249 K/UL (ref 135–420)
PLATELET # BLD AUTO: 263 K/UL (ref 135–420)
PMV BLD AUTO: 10.9 FL (ref 9.2–11.8)
PMV BLD AUTO: 11.4 FL (ref 9.2–11.8)
RBC # BLD AUTO: 4.42 M/UL (ref 4.2–5.3)
RBC # BLD AUTO: 4.51 M/UL (ref 4.2–5.3)
WBC # BLD AUTO: 15.3 K/UL (ref 4.6–13.2)
WBC # BLD AUTO: 6.1 K/UL (ref 4.6–13.2)

## 2018-08-20 PROCEDURE — 77030011278 HC ELECTRD LIG IMPT COVD -F: Performed by: OBSTETRICS & GYNECOLOGY

## 2018-08-20 PROCEDURE — 74011250636 HC RX REV CODE- 250/636: Performed by: OBSTETRICS & GYNECOLOGY

## 2018-08-20 PROCEDURE — 77030003028 HC SUT VCRL J&J -A: Performed by: OBSTETRICS & GYNECOLOGY

## 2018-08-20 PROCEDURE — 76010000162 HC OR TIME 1.5 TO 2 HR INTENSV-TIER 1: Performed by: OBSTETRICS & GYNECOLOGY

## 2018-08-20 PROCEDURE — 84703 CHORIONIC GONADOTROPIN ASSAY: CPT | Performed by: OBSTETRICS & GYNECOLOGY

## 2018-08-20 PROCEDURE — 77030032490 HC SLV COMPR SCD KNE COVD -B: Performed by: OBSTETRICS & GYNECOLOGY

## 2018-08-20 PROCEDURE — 0UT70ZZ RESECTION OF BILATERAL FALLOPIAN TUBES, OPEN APPROACH: ICD-10-PCS | Performed by: OBSTETRICS & GYNECOLOGY

## 2018-08-20 PROCEDURE — 74011000258 HC RX REV CODE- 258: Performed by: OBSTETRICS & GYNECOLOGY

## 2018-08-20 PROCEDURE — 76210000016 HC OR PH I REC 1 TO 1.5 HR: Performed by: OBSTETRICS & GYNECOLOGY

## 2018-08-20 PROCEDURE — 0UT90ZZ RESECTION OF UTERUS, OPEN APPROACH: ICD-10-PCS | Performed by: OBSTETRICS & GYNECOLOGY

## 2018-08-20 PROCEDURE — 77030002888 HC SUT CHRMC J&J -A: Performed by: OBSTETRICS & GYNECOLOGY

## 2018-08-20 PROCEDURE — 85025 COMPLETE CBC W/AUTO DIFF WBC: CPT | Performed by: OBSTETRICS & GYNECOLOGY

## 2018-08-20 PROCEDURE — 77030018842 HC SOL IRR SOD CL 9% BAXT -A: Performed by: OBSTETRICS & GYNECOLOGY

## 2018-08-20 PROCEDURE — 77030011265 HC ELECTRD BLD HEX COVD -A: Performed by: OBSTETRICS & GYNECOLOGY

## 2018-08-20 PROCEDURE — 77030034850: Performed by: OBSTETRICS & GYNECOLOGY

## 2018-08-20 PROCEDURE — 88307 TISSUE EXAM BY PATHOLOGIST: CPT | Performed by: OBSTETRICS & GYNECOLOGY

## 2018-08-20 PROCEDURE — 74011250636 HC RX REV CODE- 250/636: Performed by: NURSE ANESTHETIST, CERTIFIED REGISTERED

## 2018-08-20 PROCEDURE — 77030031139 HC SUT VCRL2 J&J -A: Performed by: OBSTETRICS & GYNECOLOGY

## 2018-08-20 PROCEDURE — 77030003029 HC SUT VCRL J&J -B: Performed by: OBSTETRICS & GYNECOLOGY

## 2018-08-20 PROCEDURE — 81025 URINE PREGNANCY TEST: CPT

## 2018-08-20 PROCEDURE — 77030002933 HC SUT MCRYL J&J -A: Performed by: OBSTETRICS & GYNECOLOGY

## 2018-08-20 PROCEDURE — 76060000034 HC ANESTHESIA 1.5 TO 2 HR: Performed by: OBSTETRICS & GYNECOLOGY

## 2018-08-20 PROCEDURE — 74011250636 HC RX REV CODE- 250/636

## 2018-08-20 PROCEDURE — 88311 DECALCIFY TISSUE: CPT | Performed by: OBSTETRICS & GYNECOLOGY

## 2018-08-20 PROCEDURE — 77030027138 HC INCENT SPIROMETER -A

## 2018-08-20 PROCEDURE — 74011250637 HC RX REV CODE- 250/637: Performed by: NURSE ANESTHETIST, CERTIFIED REGISTERED

## 2018-08-20 PROCEDURE — 36415 COLL VENOUS BLD VENIPUNCTURE: CPT | Performed by: OBSTETRICS & GYNECOLOGY

## 2018-08-20 PROCEDURE — 84702 CHORIONIC GONADOTROPIN TEST: CPT | Performed by: OBSTETRICS & GYNECOLOGY

## 2018-08-20 PROCEDURE — 65270000029 HC RM PRIVATE

## 2018-08-20 RX ORDER — FAMOTIDINE 20 MG/1
20 TABLET, FILM COATED ORAL ONCE
Status: COMPLETED | OUTPATIENT
Start: 2018-08-20 | End: 2018-08-20

## 2018-08-20 RX ORDER — LIDOCAINE HYDROCHLORIDE 20 MG/ML
INJECTION, SOLUTION EPIDURAL; INFILTRATION; INTRACAUDAL; PERINEURAL AS NEEDED
Status: DISCONTINUED | OUTPATIENT
Start: 2018-08-20 | End: 2018-08-20 | Stop reason: HOSPADM

## 2018-08-20 RX ORDER — HYDROMORPHONE HYDROCHLORIDE 1 MG/ML
INJECTION, SOLUTION INTRAMUSCULAR; INTRAVENOUS; SUBCUTANEOUS AS NEEDED
Status: DISCONTINUED | OUTPATIENT
Start: 2018-08-20 | End: 2018-08-20 | Stop reason: HOSPADM

## 2018-08-20 RX ORDER — SODIUM CHLORIDE, SODIUM LACTATE, POTASSIUM CHLORIDE, CALCIUM CHLORIDE 600; 310; 30; 20 MG/100ML; MG/100ML; MG/100ML; MG/100ML
150 INJECTION, SOLUTION INTRAVENOUS CONTINUOUS
Status: DISCONTINUED | OUTPATIENT
Start: 2018-08-20 | End: 2018-08-20 | Stop reason: HOSPADM

## 2018-08-20 RX ORDER — INSULIN LISPRO 100 [IU]/ML
INJECTION, SOLUTION INTRAVENOUS; SUBCUTANEOUS ONCE
Status: DISCONTINUED | OUTPATIENT
Start: 2018-08-20 | End: 2018-08-20 | Stop reason: HOSPADM

## 2018-08-20 RX ORDER — DOCUSATE SODIUM 100 MG/1
100 CAPSULE, LIQUID FILLED ORAL 2 TIMES DAILY
Status: DISCONTINUED | OUTPATIENT
Start: 2018-08-20 | End: 2018-08-22 | Stop reason: HOSPADM

## 2018-08-20 RX ORDER — ONDANSETRON 2 MG/ML
4 INJECTION INTRAMUSCULAR; INTRAVENOUS ONCE
Status: DISCONTINUED | OUTPATIENT
Start: 2018-08-20 | End: 2018-08-20 | Stop reason: HOSPADM

## 2018-08-20 RX ORDER — HYDROMORPHONE HYDROCHLORIDE 1 MG/ML
1 INJECTION, SOLUTION INTRAMUSCULAR; INTRAVENOUS; SUBCUTANEOUS
Status: DISCONTINUED | OUTPATIENT
Start: 2018-08-20 | End: 2018-08-22 | Stop reason: HOSPADM

## 2018-08-20 RX ORDER — ONDANSETRON 2 MG/ML
INJECTION INTRAMUSCULAR; INTRAVENOUS AS NEEDED
Status: DISCONTINUED | OUTPATIENT
Start: 2018-08-20 | End: 2018-08-20 | Stop reason: HOSPADM

## 2018-08-20 RX ORDER — FENTANYL CITRATE 50 UG/ML
INJECTION, SOLUTION INTRAMUSCULAR; INTRAVENOUS AS NEEDED
Status: DISCONTINUED | OUTPATIENT
Start: 2018-08-20 | End: 2018-08-20 | Stop reason: HOSPADM

## 2018-08-20 RX ORDER — LORAZEPAM 1 MG/1
1 TABLET ORAL
Status: DISCONTINUED | OUTPATIENT
Start: 2018-08-20 | End: 2018-08-22 | Stop reason: HOSPADM

## 2018-08-20 RX ORDER — CEFAZOLIN SODIUM 2 G/50ML
2 SOLUTION INTRAVENOUS
Status: COMPLETED | OUTPATIENT
Start: 2018-08-20 | End: 2018-08-20

## 2018-08-20 RX ORDER — VECURONIUM BROMIDE FOR INJECTION 1 MG/ML
INJECTION, POWDER, LYOPHILIZED, FOR SOLUTION INTRAVENOUS AS NEEDED
Status: DISCONTINUED | OUTPATIENT
Start: 2018-08-20 | End: 2018-08-20 | Stop reason: HOSPADM

## 2018-08-20 RX ORDER — SUCCINYLCHOLINE CHLORIDE 20 MG/ML
INJECTION INTRAMUSCULAR; INTRAVENOUS AS NEEDED
Status: DISCONTINUED | OUTPATIENT
Start: 2018-08-20 | End: 2018-08-20 | Stop reason: HOSPADM

## 2018-08-20 RX ORDER — MIDAZOLAM HYDROCHLORIDE 1 MG/ML
INJECTION, SOLUTION INTRAMUSCULAR; INTRAVENOUS AS NEEDED
Status: DISCONTINUED | OUTPATIENT
Start: 2018-08-20 | End: 2018-08-20 | Stop reason: HOSPADM

## 2018-08-20 RX ORDER — SODIUM CHLORIDE 0.9 % (FLUSH) 0.9 %
5-10 SYRINGE (ML) INJECTION AS NEEDED
Status: DISCONTINUED | OUTPATIENT
Start: 2018-08-20 | End: 2018-08-22 | Stop reason: HOSPADM

## 2018-08-20 RX ORDER — NALOXONE HYDROCHLORIDE 0.4 MG/ML
0.4 INJECTION, SOLUTION INTRAMUSCULAR; INTRAVENOUS; SUBCUTANEOUS AS NEEDED
Status: DISCONTINUED | OUTPATIENT
Start: 2018-08-20 | End: 2018-08-22 | Stop reason: HOSPADM

## 2018-08-20 RX ORDER — CEFAZOLIN SODIUM 2 G/50ML
2 SOLUTION INTRAVENOUS ONCE
Status: DISCONTINUED | OUTPATIENT
Start: 2018-08-20 | End: 2018-08-20 | Stop reason: HOSPADM

## 2018-08-20 RX ORDER — DIPHENHYDRAMINE HCL 25 MG
25 CAPSULE ORAL
Status: DISCONTINUED | OUTPATIENT
Start: 2018-08-20 | End: 2018-08-22 | Stop reason: HOSPADM

## 2018-08-20 RX ORDER — SODIUM CHLORIDE, SODIUM LACTATE, POTASSIUM CHLORIDE, CALCIUM CHLORIDE 600; 310; 30; 20 MG/100ML; MG/100ML; MG/100ML; MG/100ML
50 INJECTION, SOLUTION INTRAVENOUS CONTINUOUS
Status: DISCONTINUED | OUTPATIENT
Start: 2018-08-20 | End: 2018-08-20 | Stop reason: HOSPADM

## 2018-08-20 RX ORDER — EPHEDRINE SULFATE 50 MG/ML
INJECTION, SOLUTION INTRAVENOUS AS NEEDED
Status: DISCONTINUED | OUTPATIENT
Start: 2018-08-20 | End: 2018-08-20 | Stop reason: HOSPADM

## 2018-08-20 RX ORDER — DEXAMETHASONE SODIUM PHOSPHATE 4 MG/ML
INJECTION, SOLUTION INTRA-ARTICULAR; INTRALESIONAL; INTRAMUSCULAR; INTRAVENOUS; SOFT TISSUE AS NEEDED
Status: DISCONTINUED | OUTPATIENT
Start: 2018-08-20 | End: 2018-08-20 | Stop reason: HOSPADM

## 2018-08-20 RX ORDER — GLYCOPYRROLATE 0.2 MG/ML
INJECTION INTRAMUSCULAR; INTRAVENOUS AS NEEDED
Status: DISCONTINUED | OUTPATIENT
Start: 2018-08-20 | End: 2018-08-20 | Stop reason: HOSPADM

## 2018-08-20 RX ORDER — KETOROLAC TROMETHAMINE 30 MG/ML
30 INJECTION, SOLUTION INTRAMUSCULAR; INTRAVENOUS
Status: DISPENSED | OUTPATIENT
Start: 2018-08-20 | End: 2018-08-21

## 2018-08-20 RX ORDER — NEOSTIGMINE METHYLSULFATE 5 MG/5 ML
SYRINGE (ML) INTRAVENOUS AS NEEDED
Status: DISCONTINUED | OUTPATIENT
Start: 2018-08-20 | End: 2018-08-20 | Stop reason: HOSPADM

## 2018-08-20 RX ORDER — HYDROMORPHONE HYDROCHLORIDE 2 MG/ML
0.5 INJECTION, SOLUTION INTRAMUSCULAR; INTRAVENOUS; SUBCUTANEOUS
Status: DISCONTINUED | OUTPATIENT
Start: 2018-08-20 | End: 2018-08-20 | Stop reason: HOSPADM

## 2018-08-20 RX ORDER — HYDROCHLOROTHIAZIDE 25 MG/1
25 TABLET ORAL DAILY
Status: DISCONTINUED | OUTPATIENT
Start: 2018-08-21 | End: 2018-08-22 | Stop reason: HOSPADM

## 2018-08-20 RX ORDER — LOSARTAN POTASSIUM 50 MG/1
100 TABLET ORAL DAILY
Status: DISCONTINUED | OUTPATIENT
Start: 2018-08-21 | End: 2018-08-22 | Stop reason: HOSPADM

## 2018-08-20 RX ORDER — FENTANYL CITRATE 50 UG/ML
50 INJECTION, SOLUTION INTRAMUSCULAR; INTRAVENOUS AS NEEDED
Status: DISCONTINUED | OUTPATIENT
Start: 2018-08-20 | End: 2018-08-20 | Stop reason: HOSPADM

## 2018-08-20 RX ORDER — SODIUM CHLORIDE 0.9 % (FLUSH) 0.9 %
5-10 SYRINGE (ML) INJECTION EVERY 8 HOURS
Status: DISCONTINUED | OUTPATIENT
Start: 2018-08-20 | End: 2018-08-20

## 2018-08-20 RX ORDER — SODIUM CHLORIDE 0.9 % (FLUSH) 0.9 %
5-10 SYRINGE (ML) INJECTION EVERY 8 HOURS
Status: DISCONTINUED | OUTPATIENT
Start: 2018-08-20 | End: 2018-08-20 | Stop reason: HOSPADM

## 2018-08-20 RX ORDER — SODIUM CHLORIDE 0.9 % (FLUSH) 0.9 %
5-10 SYRINGE (ML) INJECTION AS NEEDED
Status: DISCONTINUED | OUTPATIENT
Start: 2018-08-20 | End: 2018-08-20 | Stop reason: HOSPADM

## 2018-08-20 RX ORDER — PROPOFOL 10 MG/ML
INJECTION, EMULSION INTRAVENOUS AS NEEDED
Status: DISCONTINUED | OUTPATIENT
Start: 2018-08-20 | End: 2018-08-20 | Stop reason: HOSPADM

## 2018-08-20 RX ORDER — OXYCODONE AND ACETAMINOPHEN 7.5; 325 MG/1; MG/1
1 TABLET ORAL
Status: DISCONTINUED | OUTPATIENT
Start: 2018-08-20 | End: 2018-08-22 | Stop reason: HOSPADM

## 2018-08-20 RX ORDER — AMLODIPINE BESYLATE 10 MG/1
10 TABLET ORAL DAILY
Status: DISCONTINUED | OUTPATIENT
Start: 2018-08-21 | End: 2018-08-22 | Stop reason: HOSPADM

## 2018-08-20 RX ORDER — KETOROLAC TROMETHAMINE 30 MG/ML
INJECTION, SOLUTION INTRAMUSCULAR; INTRAVENOUS AS NEEDED
Status: DISCONTINUED | OUTPATIENT
Start: 2018-08-20 | End: 2018-08-20 | Stop reason: HOSPADM

## 2018-08-20 RX ADMIN — VECURONIUM BROMIDE FOR INJECTION 2 MG: 1 INJECTION, POWDER, LYOPHILIZED, FOR SOLUTION INTRAVENOUS at 11:19

## 2018-08-20 RX ADMIN — PROMETHAZINE HYDROCHLORIDE 12.5 MG: 25 INJECTION INTRAMUSCULAR; INTRAVENOUS at 20:49

## 2018-08-20 RX ADMIN — SODIUM CHLORIDE, SODIUM LACTATE, POTASSIUM CHLORIDE, AND CALCIUM CHLORIDE 150 ML/HR: 600; 310; 30; 20 INJECTION, SOLUTION INTRAVENOUS at 09:00

## 2018-08-20 RX ADMIN — KETOROLAC TROMETHAMINE 30 MG: 30 INJECTION, SOLUTION INTRAMUSCULAR; INTRAVENOUS at 12:11

## 2018-08-20 RX ADMIN — Medication 10 ML: at 15:22

## 2018-08-20 RX ADMIN — GLYCOPYRROLATE 0.4 MG: 0.2 INJECTION INTRAMUSCULAR; INTRAVENOUS at 12:15

## 2018-08-20 RX ADMIN — HYDROMORPHONE HYDROCHLORIDE 1 MG: 1 INJECTION, SOLUTION INTRAMUSCULAR; INTRAVENOUS; SUBCUTANEOUS at 11:21

## 2018-08-20 RX ADMIN — SODIUM CHLORIDE, SODIUM LACTATE, POTASSIUM CHLORIDE, AND CALCIUM CHLORIDE: 600; 310; 30; 20 INJECTION, SOLUTION INTRAVENOUS at 11:15

## 2018-08-20 RX ADMIN — CEFAZOLIN SODIUM 2 G: 2 SOLUTION INTRAVENOUS at 11:07

## 2018-08-20 RX ADMIN — FENTANYL CITRATE 100 MCG: 50 INJECTION, SOLUTION INTRAMUSCULAR; INTRAVENOUS at 10:53

## 2018-08-20 RX ADMIN — SUCCINYLCHOLINE CHLORIDE 100 MG: 20 INJECTION INTRAMUSCULAR; INTRAVENOUS at 10:54

## 2018-08-20 RX ADMIN — FENTANYL CITRATE 50 MCG: 50 INJECTION, SOLUTION INTRAMUSCULAR; INTRAVENOUS at 13:23

## 2018-08-20 RX ADMIN — DEXAMETHASONE SODIUM PHOSPHATE 4 MG: 4 INJECTION, SOLUTION INTRA-ARTICULAR; INTRALESIONAL; INTRAMUSCULAR; INTRAVENOUS; SOFT TISSUE at 10:57

## 2018-08-20 RX ADMIN — LIDOCAINE HYDROCHLORIDE 60 MG: 20 INJECTION, SOLUTION EPIDURAL; INFILTRATION; INTRACAUDAL; PERINEURAL at 10:53

## 2018-08-20 RX ADMIN — HYDROMORPHONE HYDROCHLORIDE 1 MG: 1 INJECTION, SOLUTION INTRAMUSCULAR; INTRAVENOUS; SUBCUTANEOUS at 17:40

## 2018-08-20 RX ADMIN — EPHEDRINE SULFATE 10 MG: 50 INJECTION, SOLUTION INTRAVENOUS at 12:20

## 2018-08-20 RX ADMIN — VECURONIUM BROMIDE FOR INJECTION 3 MG: 1 INJECTION, POWDER, LYOPHILIZED, FOR SOLUTION INTRAVENOUS at 11:00

## 2018-08-20 RX ADMIN — VECURONIUM BROMIDE FOR INJECTION 1 MG: 1 INJECTION, POWDER, LYOPHILIZED, FOR SOLUTION INTRAVENOUS at 11:55

## 2018-08-20 RX ADMIN — FAMOTIDINE 20 MG: 20 TABLET ORAL at 09:03

## 2018-08-20 RX ADMIN — Medication 3 MG: at 12:15

## 2018-08-20 RX ADMIN — ONDANSETRON 4 MG: 2 INJECTION INTRAMUSCULAR; INTRAVENOUS at 12:02

## 2018-08-20 RX ADMIN — SODIUM CHLORIDE, SODIUM LACTATE, POTASSIUM CHLORIDE, AND CALCIUM CHLORIDE: 600; 310; 30; 20 INJECTION, SOLUTION INTRAVENOUS at 10:24

## 2018-08-20 RX ADMIN — MIDAZOLAM HYDROCHLORIDE 2 MG: 1 INJECTION, SOLUTION INTRAMUSCULAR; INTRAVENOUS at 10:47

## 2018-08-20 RX ADMIN — EPHEDRINE SULFATE 10 MG: 50 INJECTION, SOLUTION INTRAVENOUS at 11:37

## 2018-08-20 RX ADMIN — PROPOFOL 200 MG: 10 INJECTION, EMULSION INTRAVENOUS at 10:53

## 2018-08-20 NOTE — ANESTHESIA POSTPROCEDURE EVALUATION
Post-Anesthesia Evaluation and Assessment    Patient: Michael Potts MRN: [id number]  SSN: [id number]    YOB: 1975  Age: 40 y.o. Sex: female       Cardiovascular Function/Vital Signs  Visit Vitals    /82    Pulse (!) 101    Temp 36.6 °C (97.9 °F)    Resp 21    Ht 5' 9\" (1.753 m)    Wt 112 kg (247 lb)    SpO2 100%    BMI 36.48 kg/m2       Patient is status post general anesthesia for Procedure(s):  total  ABDOMINAL  HYSTERECTOMY BILATERAL SALPINGECTOMY. Nausea/Vomiting: None    Postoperative hydration reviewed and adequate. Pain:  Pain Scale 1: Visual (08/20/18 1242)  Pain Intensity 1: 0 (08/20/18 1242)   Managed    Neurological Status:   Neuro (WDL): Within Defined Limits (08/20/18 1242)   At baseline    Mental Status and Level of Consciousness: Arousable    Pulmonary Status:   O2 Device: Oxygen mask;Oral airway (08/20/18 1242)   Adequate oxygenation and airway patent    Complications related to anesthesia: None    Post-anesthesia assessment completed.  No concerns    Signed By: Richard North MD     August 20, 2018

## 2018-08-20 NOTE — PROGRESS NOTES
Patient received from PACU accompanied by Juana Valles RN and Blanquita Cordon. N.A by way of bed to room 2205. Patient drowsy, however easily aroused by verbal command. Patient does not show any signs of  pain and/or discomfort. Pt verbalized a request for ginger ale from [place], RN. Ginger Ale to patient bedside. Patient skin warm, dry and intact except for surgical incision to lower abdomen with derma bond. Call light in reach. Bed locked in lowest position. Encouraged to call for assistance, Pt verbalized understanding. Will advise primary nurse.

## 2018-08-20 NOTE — IP AVS SNAPSHOT
[address] Patient: Rin Armenta MRN: [id number] [facility]:[birth year] A check jan indicates which time of day the medication should be taken. My Medications CHANGE how you take these medications Instructions Each Dose to Equal  
 Morning Noon Evening Bedtime ALPRAZolam 2 mg tablet Commonly known as:  Spring Sharma What changed:   
- when to take this 
- reasons to take this Your last dose was: Your next dose is: Take 1 Tab by mouth two (2) times a day. Max Daily Amount: 4 mg. Indications: anxiety, Generalized Anxiety Disorder, Panic Disorder 2 mg CONTINUE taking these medications Instructions Each Dose to Equal  
 Morning Noon Evening Bedtime  
 amLODIPine 10 mg tablet Commonly known as:  Susan Nata Your last dose was: Your next dose is: Take 1 Tab by mouth daily. 10 mg  
    
   
   
   
  
 hydroCHLOROthiazide 25 mg tablet Commonly known as:  HYDRODIURIL Your last dose was: Your next dose is: Take 1 Tab by mouth daily. 25 mg  
    
   
   
   
  
 losartan 100 mg tablet Commonly known as:  COZAAR Your last dose was: Your next dose is: Take 1 Tab by mouth daily. 100 mg  
    
   
   
   
  
 naproxen sodium 550 mg tablet Commonly known as:  Thermon Bathe Your last dose was: Your next dose is: TAKE 1 TABLET BY MOUTH TWICE DAILY WITH MEALS  
     
   
   
   
  
 norethindrone 0.35 mg Tab Commonly known as:  Christopher & Christopher Your last dose was: Your next dose is: TAKE 1 TABLET BY MOUTH DAILY

## 2018-08-20 NOTE — PROGRESS NOTES
a  Post op surgery note    Patient: Meenakshi Allan MRN: [id number]  SSN: [id number]    YOB: 1975  Age: 40 y.o. Sex: female        Preop Diagnosis: d25.9 fibroids    Postop Diagnosis:d25.9 fibroids    Procedure:Procedure(s):  total  ABDOMINAL  HYSTERECTOMY BILATERAL SALPINGECTOMY    Historical Additional  Problem List.: Problem List as of 2018  Date Reviewed: 7/3/2018          Codes Class Noted - Resolved    Fibroids ICD-10-CM: D25.9  ICD-9-CM: 218.9  2018 - Present        Essential hypertension with goal blood pressure less than 140/90 ICD-10-CM: I10  ICD-9-CM: 401.9  2016 - Present        Anxiety ICD-10-CM: F41.9  ICD-9-CM: 300.00  2015 - Present        Phobia, flying ICD-10-CM: F40.243  ICD-9-CM: 300.29  2015 - Present        Hypertension ICD-10-CM: I10  ICD-9-CM: 401.9  2015 - Present        Rectal bleeding ICD-10-CM: K62.5  ICD-9-CM: 569.3  2014 - Present        RESOLVED: Elevated blood pressure (not hypertension) ICD-10-CM: R03.0  ICD-9-CM: 796.2  6/10/2014 - 2016               POD: Day of Surgery     Patient is feeling well. Good pain control. Ambulating, voiding and eating well, no nausea or vomiting.     Patient Vitals for the past 8 hrs:   Temp Pulse Resp BP SpO2   18 1456 97.9 °F (36.6 °C) (!) 103 18 134/88 92 %   18 1410 - (!) 106 26 - 95 %   18 1402 98.1 °F (36.7 °C) 100 16 124/81 94 %   18 1347 - 97 15 129/79 -   18 1346 - 99 20 - 94 %   18 1332 - (!) 103 18 137/86 96 %   18 1317 - (!) 101 16 128/80 93 %   18 1302 - 100 20 138/88 100 %   18 1257 - (!) 103 18 135/85 100 %   18 1252 - 100 17 (!) 139/92 100 %   18 1247 - (!) 101 21 122/82 100 %   18 1243 - (!) 106 16 - 99 %   18 1242 97.9 °F (36.6 °C) (!) 104 16 120/84 100 %       Temp (24hrs), Av.6 °F (37 °C), Min:97.9 °F (36.6 °C), Max:100.3 °F (37.9 °C)      WBC   Date/Time Value Ref Range Status   2018 08:45 AM 6.1 4.6 - 13.2 K/uL Final   06/12/2018 12:29 PM 5.4 4.0 - 11.0 K/uL Final   06/05/2018 01:40 PM 6.0 4.6 - 13.2 K/uL Final     HGB   Date/Time Value Ref Range Status   08/20/2018 08:45 AM 12.6 12.0 - 16.0 g/dL Final   06/12/2018 12:29 PM 12.7 11.7 - 15.5 g/dL Final   06/05/2018 01:40 PM 12.2 12.0 - 16.0 g/dL Final     PLATELET   Date/Time Value Ref Range Status   08/20/2018 08:45  135 - 420 K/uL Final       Per my exam and/or the nursing assessment:  Chest is clear to auscultation. Abdomen soft not tender. Bowel sounds are normal  Incisions are clean  Legs are normal without tenderness, swelling, or redness    Impression: Doing well,  Pulse up a bit but stable and not a very bloody case except rectus muscle oozing    Plan: Observe for now. Probable discharge two days.      Adriane Vanessa MD

## 2018-08-20 NOTE — IP AVS SNAPSHOT
[address] Patient: Juani Cuadra MRN: [id number] DGW:5/0/2150 About your hospitalization You were admitted on:  August 20, 2018 You last received care in the:  [address] [place] [address],[address] You were discharged on:  August 22, 2018 Why you were hospitalized Your primary diagnosis was:  Fibroids, Intramural  
 Your diagnoses also included:  Fibroids, Submucosal  
  
Follow-up Information Follow up With Details Comments Contact Info [address] [facility],    [address] [facility] [address] [address] [place] [address] [phone] Rosa Latham MD  as instructed  [address] [facility] [address] [place] [address] [phone] Discharge Orders None A check jan indicates which time of day the medication should be taken. My Medications CHANGE how you take these medications Instructions Each Dose to Equal  
 Morning Noon Evening Bedtime ALPRAZolam 2 mg tablet Commonly known as:  Brennan Garcia What changed:   
- when to take this 
- reasons to take this Your last dose was: Your next dose is: Take 1 Tab by mouth two (2) times a day. Max Daily Amount: 4 mg. Indications: anxiety, Generalized Anxiety Disorder, Panic Disorder 2 mg CONTINUE taking these medications Instructions Each Dose to Equal  
 Morning Noon Evening Bedtime  
 amLODIPine 10 mg tablet Commonly known as:  Love Breach Your last dose was: Your next dose is: Take 1 Tab by mouth daily. 10 mg  
    
   
   
   
  
 hydroCHLOROthiazide 25 mg tablet Commonly known as:  HYDRODIURIL Your last dose was: Your next dose is: Take 1 Tab by mouth daily. 25 mg  
    
   
   
   
  
 losartan 100 mg tablet Commonly known as:  COZAAR Your last dose was: Your next dose is: Take 1 Tab by mouth daily. 100 mg  
    
   
   
   
  
 naproxen sodium 550 mg tablet Commonly known as:  Marcelino Rajput Your last dose was: Your next dose is: TAKE 1 TABLET BY MOUTH TWICE DAILY WITH MEALS  
     
   
   
   
  
 norethindrone 0.35 mg Tab Commonly known as:  Christopher & Christopher Your last dose was: Your next dose is: TAKE 1 TABLET BY MOUTH DAILY Discharge Instructions DISCHARGE SUMMARY from Nurse PATIENT INSTRUCTIONS: 
 
 
F-face looks uneven A-arms unable to move or move unevenly S-speech slurred or non-existent T-time-call 911 as soon as signs and symptoms begin-DO NOT go Back to bed or wait to see if you get better-TIME IS BRAIN. Warning Signs of HEART ATTACK Call 911 if you have these symptoms: 
? Chest discomfort. Most heart attacks involve discomfort in the center of the chest that lasts more than a few minutes, or that goes away and comes back. It can feel like uncomfortable pressure, squeezing, fullness, or pain. ? Discomfort in other areas of the upper body. Symptoms can include pain or discomfort in one or both arms, the back, neck, jaw, or stomach. ? Shortness of breath with or without chest discomfort. ? Other signs may include breaking out in a cold sweat, nausea, or lightheadedness. Don't wait more than five minutes to call [address]! Fast action can save your life. Calling 911 is almost always the fastest way to get lifesaving treatment. Emergency Medical Services staff can begin treatment when they arrive  up to an hour sooner than if someone gets to the hospital by car. The discharge information has been reviewed with the patient. The patient verbalized understanding.  
Discharge medications reviewed with the patient and appropriate educational materials and side effects teaching were provided. ___________________________________________________________________________________________________________________________________ [facility] [address] [place] [address] [phone] Name: Meenakshi Allan Medical Record Number: [id number] YOB: 1975 Today's Date: August 22, 2018 Admit date: 8/20/2018 Discharge Date:  
 
Attending Physician: Nya Edwards MD 
 
Admission Diagnoses: d25.9 fibroids Fibroids Discharge Diagnoses:  
Problem List as of 8/22/2018  Date Reviewed: 7/3/2018 Codes Class Noted - Resolved * (Principal)Fibroids, intramural ICD-10-CM: D25.1 ICD-9-CM: 218.1  8/22/2018 - Present Essential hypertension with goal blood pressure less than 140/90 ICD-10-CM: I10 
ICD-9-CM: 401.9  6/14/2016 - Present Anxiety ICD-10-CM: F41.9 ICD-9-CM: 300.00  12/9/2015 - Present Phobia, flying ICD-10-CM: X00.596 ICD-9-CM: 300.29  12/9/2015 - Present Hypertension ICD-10-CM: I10 
ICD-9-CM: 401.9  4/20/2015 - Present Rectal bleeding ICD-10-CM: K62.5 ICD-9-CM: 569.3  8/13/2014 - Present RESOLVED: Fibroids, submucosal ICD-10-CM: D25.0 ICD-9-CM: 218.0  8/20/2018 - 8/22/2018 RESOLVED: Elevated blood pressure (not hypertension) ICD-10-CM: R03.0 ICD-9-CM: 796.2  6/10/2014 - 6/14/2016 PROCEDURE: Procedure(s): 
total  ABDOMINAL  HYSTERECTOMY BILATERAL SALPINGECTOMY Patient Instructions: 
 
 
Notify OUR OFFICE  IMMEDIATELY if any of the following occur: ? You are unable to urinate. Urgency to urinate is not uncommon. ? Excessive vaginal bleeding > 1 maxi pad an hour for more then 2 hours straight. ? Temperature above 101.0° and / or chills. ? You are nauseous and / or vomiting and you cannot hold down any fluids. ? Your pain is not controlled with the pain medication prescribed. Special Considerations:  
 
? Do not drive for at least 24 hours after any  Procedure involving incisions and when  you are no longer taking narcotic pain medication and you are able to move and react without hesitation. ? You may return to work in 6 weeks unless we discussed returning sooner. [x] Pelvic rest (nothing in the vagina) for 6 weeks. [x] No heavy lifting over 10 pounds & no strenuous exercise for 6 weeks. [] Other instructions:   
  
 
 
Please contact or office or go to the nearest Emergency Department / Urgent Care facility for any other medical questions or concerns. Patient Instructions:Continue routine post-op care with discharge home. Instructions given for activity, nutrition, and medications, as well as to call for signs of fever over 101, increasing pain or heavy bleeding, foul discharge, and pelvic rest for six weeks. Please call for an appointment  to the office in between 2 and 6 weeks. Activity: No sex, douching, or tampons for 6 weeks or as directed by your physician. No heavy lifting for 6 weeks. No driving while taking pain medication. Diet: Resume pre-hospital diet. Wound Care: Keep wound clean and dry, but may do warm moist  Soaks 2-3 times per day. No orders of the defined types were placed in this encounter. Signed By:  Akash Orellana MD   
 August 22, 2018 DISCHARGE SUMMARY from Nurse PATIENT INSTRUCTIONS: 
 
 
F-face looks uneven A-arms unable to move or move unevenly S-speech slurred or non-existent T-time-call 911 as soon as signs and symptoms begin-DO NOT go  
 Back to bed or wait to see if you get better-TIME IS BRAIN. Warning Signs of HEART ATTACK Call 911 if you have these symptoms: 
? Chest discomfort. Most heart attacks involve discomfort in the center of the chest that lasts more than a few minutes, or that goes away and comes back. It can feel like uncomfortable pressure, squeezing, fullness, or pain. ? Discomfort in other areas of the upper body. Symptoms can include pain or discomfort in one or both arms, the back, neck, jaw, or stomach. ? Shortness of breath with or without chest discomfort. ? Other signs may include breaking out in a cold sweat, nausea, or lightheadedness. Don't wait more than five minutes to call [address]! Fast action can save your life. Calling 911 is almost always the fastest way to get lifesaving treatment. Emergency Medical Services staff can begin treatment when they arrive  up to an hour sooner than if someone gets to the hospital by car. The discharge information has been reviewed with the patient. The patient verbalized understanding. Discharge medications reviewed with the patient and appropriate educational materials and side effects teaching were provided. Patient armband removed and shredded. ___________________________________________________________________________________________________________________________________ Concept3Dhart Announcement We are excited to announce that we are making your provider's discharge notes available to you in Concept3Dhart. You will see these notes when they are completed and signed by the physician that discharged you from your recent hospital stay. If you have any questions or concerns about any information you see in Concept3Dhart, please call the Health Information Department where you were seen or reach out to your Primary Care Provider for more information about your plan of care. Introducing [facility]! Dear Lala Purcell: Thank you for requesting a SimilarWeb account. Our records indicate that you already have an active SimilarWeb account. You can access your account anytime at [email or web address] Did you know that you can access your hospital and ER discharge instructions at any time in SimilarWeb? You can also review all of your test results from your hospital stay or ER visit. Additional Information If you have questions, please visit the Frequently Asked Questions section of the SimilarWeb website at [email or web address] Remember, SimilarWeb is NOT to be used for urgent needs. For medical emergencies, dial 911. Now available from your iPhone and Android! Introducing Ranjit Major As a [facility] patient, I wanted to make you aware of our electronic visit tool called Ranjit PradoNotifixious. GoVoluntr 24/7 allows you to connect within minutes with a medical provider 24 hours a day, seven days a week via a mobile device or tablet or logging into a secure website from your computer. You can access Ranjit Pradofin from anywhere in the [place]. A virtual visit might be right for you when you have a simple condition and feel like you just dont want to get out of bed, or cant get away from work for an appointment, when your regular [facility] provider is not available (evenings, weekends or holidays), or when youre out of town and need minor care. Electronic visits cost only $49 and if the KarleneAcomni/7 provider determines a prescription is needed to treat your condition, one can be electronically transmitted to a nearby pharmacy*. Please take a moment to enroll today if you have not already done so. The enrollment process is free and takes just a few minutes. To enroll, please download the GoVoluntr 24/7 ander to your tablet or phone, or visit [email or web address] to enroll on your computer. And, as an [address] [place] [address] patient with a Framehawk account, the results of your visits will be scanned into your electronic medical record and your primary care provider will be able to view the scanned results. We urge you to continue to see your regular [facility] provider for your ongoing medical care. And while your primary care provider may not be the one available when you seek a Ranjit Pradofin virtual visit, the peace of mind you get from getting a real diagnosis real time can be priceless. For more information on Ranjit Pradofin, view our Frequently Asked Questions (FAQs) at [email or web address]. Sincerely, 
 
Theresa Aguilar MD 
Chief Medical Officer Bryce8 Marcela Hernandez *:  certain medications cannot be prescribed via Ranjit Johanfin Providers Seen During Your Hospitalization Provider Specialty Primary office phone Jorden Segundo MD Obstetrics & Gynecology [phone] Your Primary Care Physician (PCP) Primary Care Physician Office Phone Office Fax Harlie Schlatter [phone] You are allergic to the following Allergen Reactions Lisinopril Cough Recent Documentation Height Weight BMI OB Status Smoking Status 1.753 m 112 kg 36.48 kg/m2 Having regular periods Never Smoker Emergency Contacts Name Discharge Info Relation Home Work Mobile Hortencia Allen DISCHARGE CAREGIVER [3] Parent [1] [phone] Patricia Allen DISCHARGE CAREGIVER [3] Sister [23] [phone] Patient Belongings The following personal items are in your possession at time of discharge: 
  Dental Appliances: None  Visual Aid: None      Home Medications: None   Jewelry: None  Clothing: Dress, Undergarments, Socks, Slippers    Other Valuables: [employer] Discharge Instructions Attachments/References HYSTERECTOMY: ABDOMINAL: POST-OP (ENGLISH) Patient Handouts Abdominal Hysterectomy: What to Expect at [facility] Your Recovery You can expect to feel better and stronger each day, although you may need pain medicine for a week or two. You may get tired easily or have less energy than usual. This may last for several weeks after surgery. You will probably notice that your belly is swollen and puffy. This is common. The swelling will take several weeks to go down. It may take about 4 to 6 weeks to fully recover. It is important to avoid lifting while you are recovering so that you can heal. 
This care sheet gives you a general idea about how long it will take for you to recover. But each person recovers at a different pace. Follow the steps below to get better as quickly as possible. How can you care for yourself at home? Activity 
  · Rest when you feel tired. Getting enough sleep will help you recover.  
  · Try to walk each day. Start by walking a little more than you did the day before. Bit by bit, increase the amount you walk. Walking boosts blood flow and helps prevent pneumonia and constipation.  
  · Avoid lifting anything that would make you strain. This may include a child, heavy grocery bags and milk containers, a heavy briefcase or backpack, cat litter or dog food bags, or a vacuum .  
  · Avoid strenuous activities, such as biking, jogging, weight lifting, or aerobic exercise, until your doctor says it is okay.  
  · You may shower. Pat the cut (incision) dry. Do not take a bath for the first 2 weeks, or until your doctor tells you it is okay.  
  · Ask your doctor when you can drive again.  
  · You will probably need to take 2 to 4 weeks off from work. It depends on the type of work you do and how you feel.  
  · Your doctor will tell you when you can have sex again. Diet 
  · You can eat your normal diet. If your stomach is upset, try bland, low-fat foods like plain rice, broiled chicken, toast, and yogurt.   · Drink plenty of fluids (unless your doctor tells you not to).  
  · You may notice that your bowel movements are not regular right after your surgery. This is common. Try to avoid constipation and straining with bowel movements. You may want to take a fiber supplement every day. If you have not had a bowel movement after a couple of days, ask your doctor about taking a mild laxative. Medicines 
  · Your doctor will tell you if and when you can restart your medicines. He or she will also give you instructions about taking any new medicines.  
  · If you take blood thinners, such as warfarin (Coumadin), clopidogrel (Plavix), or aspirin, be sure to talk to your doctor. He or she will tell you if and when to start taking those medicines again. Make sure that you understand exactly what your doctor wants you to do.  
  · Be safe with medicines. Take pain medicines exactly as directed. ¨ If the doctor gave you a prescription medicine for pain, take it as prescribed. ¨ If you are not taking a prescription pain medicine, ask your doctor if you can take an over-the-counter medicine.  
  · If your doctor prescribed antibiotics, take them as directed. Do not stop taking them just because you feel better. You need to take the full course of antibiotics.  
  · If you think your pain medicine is making you sick to your stomach: 
¨ Take your medicine after meals (unless your doctor has told you not to). ¨ Ask your doctor for a different pain medicine. Incision care 
  · If you have strips of tape on the cut (incision) the doctor made, leave the tape on for a week or until it falls off. Or follow your doctor's instructions for removing the tape.  
  · Wash the area daily with warm, soapy water, and pat it dry. Don't use hydrogen peroxide or alcohol, which can slow healing. You may cover the area with a gauze bandage if it weeps or rubs against clothing. Change the bandage every day.  
  · Keep the area clean and dry. Other instructions 
  · You may have some light vaginal bleeding. Wear sanitary pads if needed. Do not douche or use tampons. Follow-up care is a key part of your treatment and safety. Be sure to make and go to all appointments, and call your doctor if you are having problems. It's also a good idea to know your test results and keep a list of the medicines you take. When should you call for help? Call 911 anytime you think you may need emergency care. For example, call if: 
  · You passed out (lost consciousness).  
  · You have chest pain, are short of breath, or cough up blood.  
 Call your doctor now or seek immediate medical care if: 
  · You have pain that does not get better after you take pain medicine.  
  · You cannot pass stools or gas.  
  · You have vaginal discharge that has increased in amount or smells bad.  
  · You are sick to your stomach or cannot drink fluids.  
  · You have loose stitches, or your incision comes open.  
  · Bright red blood has soaked through the bandage over your incision.  
  · You have signs of infection, such as: 
¨ Increased pain, swelling, warmth, or redness. ¨ Red streaks leading from the incision. ¨ Pus draining from the incision. ¨ A fever.  
  · You have bright red vaginal bleeding that soaks one or more pads in an hour, or you have large clots.  
  · You have signs of a blood clot in your leg (called a deep vein thrombosis), such as: 
¨ Pain in your calf, back of the knee, thigh, or groin. ¨ Redness and swelling in your leg.  
 Watch closely for changes in your health, and be sure to contact your doctor if you have any problems. Where can you learn more? Go to http://latoya-david.info/. Enter M280 in the search box to learn more about \"Abdominal Hysterectomy: What to Expect at Home. \" Current as of: October 6, 2017 Content Version: 11.7 © 8657-9894 [employer].  Care instructions adapted under license by [address] (which disclaims liability or warranty for this information). If you have questions about a medical condition or this instruction, always ask your healthcare professional. [address] any warranty or liability for your use of this information. Please provide this summary of care documentation to your next provider. Signatures-by signing, you are acknowledging that this After Visit Summary has been reviewed with you and you have received a copy. Patient Signature:  ____________________________________________________________ Date:  ____________________________________________________________  
  
[facility] Provider Signature:  ____________________________________________________________ Date:  ____________________________________________________________

## 2018-08-20 NOTE — PERIOP NOTES
Blood Bank called and confirmed that Blood Band does not need to be replaced. Pt began to itch after CHG wipes were used. Used antibacterial soap to clean off.

## 2018-08-20 NOTE — ANESTHESIA PREPROCEDURE EVALUATION
Anesthetic History   No history of anesthetic complications            Review of Systems / Medical History  Patient summary reviewed and pertinent labs reviewed    Pulmonary  Within defined limits                 Neuro/Psych   Within defined limits           Cardiovascular    Hypertension              Exercise tolerance: >4 METS     GI/Hepatic/Renal  Within defined limits              Endo/Other        Morbid obesity     Other Findings   Comments: Documentation of current medication  Current medications obtained, documented and obtained? YES      Risk Factors for Postoperative nausea/vomiting:       History of postoperative nausea/vomiting? NO       Female? YES       Motion sickness? NO       Intended opioid administration for postoperative analgesia? YES      Smoking Abstinence:  Current Smoker? NO  Elective Surgery? YES  Seen preoperatively by anesthesiologist or proxy prior to day of surgery? YES  Pt abstained from smoking 24 hours prior to anesthesia?  N/A    Preventive care/screening for High Blood Pressure:  Aged 18 years and older: YES  Screened for high blood pressure: YES  Patients with high blood pressure referred to primary care provider   for BP management: YES           Physical Exam    Airway  Mallampati: II  TM Distance: 4 - 6 cm  Neck ROM: normal range of motion   Mouth opening: Normal     Cardiovascular  Regular rate and rhythm,  S1 and S2 normal,  no murmur, click, rub, or gallop  Rhythm: regular  Rate: normal         Dental    Dentition: Lower dentition intact and Upper dentition intact     Pulmonary  Breath sounds clear to auscultation               Abdominal  GI exam deferred       Other Findings            Anesthetic Plan    ASA: 2  Anesthesia type: general          Induction: Intravenous  Anesthetic plan and risks discussed with: Patient

## 2018-08-20 NOTE — H&P
I have seen and examined patient,  No interval changes. Will proceed with scheduled procedure. Visit Vitals    /89    Pulse 88    Temp 100.3 °F (37.9 °C)    Resp 20    Ht 5' 9\" (1.753 m)    Wt 112 kg (247 lb)    SpO2 100%    BMI 36.48 kg/m2     Recent Results (from the past 24 hour(s))   HCG URINE, QL. - POC    Collection Time: 08/20/18  8:27 AM   Result Value Ref Range    Pregnancy test,urine (POC) NEGATIVE  NEG     HCG QL SERUM    Collection Time: 08/20/18  8:45 AM   Result Value Ref Range    HCG, Ql. NEGATIVE  NEG     BETA HCG, QT    Collection Time: 08/20/18  8:45 AM   Result Value Ref Range    Beta HCG, QT <1 0 - 10 MIU/ML   CBC WITH AUTOMATED DIFF    Collection Time: 08/20/18  8:45 AM   Result Value Ref Range    WBC 6.1 4.6 - 13.2 K/uL    RBC 4.51 4.20 - 5.30 M/uL    HGB 12.6 12.0 - 16.0 g/dL    HCT 37.6 35.0 - 45.0 %    MCV 83.4 74.0 - 97.0 FL    MCH 27.9 24.0 - 34.0 PG    MCHC 33.5 31.0 - 37.0 g/dL    RDW 16.9 (H) 11.6 - 14.5 %    PLATELET 886 414 - 774 K/uL    MPV 10.9 9.2 - 11.8 FL    NEUTROPHILS 65 40 - 73 %    LYMPHOCYTES 25 21 - 52 %    MONOCYTES 7 3 - 10 %    EOSINOPHILS 2 0 - 5 %    BASOPHILS 1 0 - 2 %    ABS. NEUTROPHILS 4.0 1.8 - 8.0 K/UL    ABS. LYMPHOCYTES 1.5 0.9 - 3.6 K/UL    ABS. MONOCYTES 0.4 0.05 - 1.2 K/UL    ABS. EOSINOPHILS 0.1 0.0 - 0.4 K/UL    ABS. BASOPHILS 0.1 0.0 - 0.1 K/UL    DF AUTOMATED       . Leatha Duval MD, PGY-4

## 2018-08-20 NOTE — PROGRESS NOTES
TRANSFER - IN REPORT:    Verbal report received from [facility] on Allyson Nava  being received from PACU for routine post - op      Report consisted of patients Situation, Background, Assessment and   Recommendations(SBAR). Information from the following report(s) SBAR and Intake/Output was reviewed with the receiving nurse. Opportunity for questions and clarification was provided.

## 2018-08-20 NOTE — PERIOP NOTES
Rec'd care of pt from OR via bed. Oral airway in place. OR, MAR and anesthesia report acknowledge. Attached to monitor. VSS. Will cont to monitor. 1407  TRANSFER - OUT REPORT:    Verbal report given to ANDREA Singh (name) on Cynthia Pink  being transferred to 2200 (unit) for routine post - op       Report consisted of patients Situation, Background, Assessment and   Recommendations(SBAR). Information from the following report(s) SBAR, Kardex, OR Summary, Intake/Output, MAR and Cardiac Rhythm sinus rhythm, sinus tach was reviewed with the receiving nurse. Lines:   Peripheral IV 08/20/18 Left Hand (Active)   Site Assessment Clean, dry, & intact 8/20/2018 12:42 PM   Phlebitis Assessment 0 8/20/2018 12:42 PM   Infiltration Assessment 0 8/20/2018 12:42 PM   Dressing Status Clean, dry, & intact 8/20/2018 12:42 PM   Dressing Type Transparent;Tape 8/20/2018 12:42 PM   Hub Color/Line Status Pink; Infusing 8/20/2018 12:42 PM   Action Taken Open ports on tubing capped 8/20/2018 12:42 PM   Alcohol Cap Used Yes 8/20/2018 12:42 PM        Opportunity for questions and clarification was provided.       Patient transported with:   O2 @ 2 liters  Registered Nurse  [facility]

## 2018-08-20 NOTE — PROCEDURES
Willis Schrader MD  [address]  [address]  [address]  [address]  [phone]         Hysterectomy Procedure Note    Date: 8/20/18    Indications:  Uterine fibroids, menorrhagia    Pre-operative Diagnosis: Uterine fibroids, 1090 gm uterus cervix and tubes. Post-operative Diagnosis: Same    Operation: Total abdominal hysterectomy, bilateral salpingectomy    Surgeon: Dr. Kyler Gonzalez  Assist: Maria T Rodriguez MD, PGY-3    Anesthesia: GETA    Anesthesia: General endotracheal anesthesia      Procedure Details     The patient was seen in the Holding Room. The risks, benefits, complications, treatment options, and expected outcomes were discussed with the patient. The patient concurred with the proposed plan, giving informed consent. The site of surgery properly noted/marked. The patient was taken to Operating Room, identified as Zora Zuniga  and the procedure verified as Total abdominal hysterectomy. A Time Out was held and the above information confirmed.     After induction of anesthesia, patient was placed in supine position and draped and prepped in the usual sterile manner. Chandra catheter was placed.     A Phannasteil incision was made and carried through the subcutaneous tissue to the fascia. Fascial incision was made and extended laterally with sharp and blunt dissection. The rectus muscles were . The peritoneum was identified and entered. Peritoneal incision was extended longitudinally.        The fundus of the uterus was then grasped with a corkscrew and retracted out of the pelvic cavity into the abdominal site. Subsequently, using the LigaSure cautery unit, the round ligaments were grasped, cauterized, and dissected. The bladder flap was then formed and the bladder flap was pushed away down anteriorly over the lower uterine segment, pushed away from the operative site on both the right and left sides.  Subsequently, the posterior leaf of the broad ligament was opened sharply and the LigaSure instrument was then placed below the level of the ovary in both the right and left side, care being taken not to damage bowel or uterus and the infundibulopelvic ligament was then grasped, cauterized, and again dissected. Further dissection of the broad ligament was carried down posteriorly towards the uterine vessels. The bladder was pushed inferiorly down towards the vagina. Subsequently, the uterine vessels were then grasped again with the LigaSure machine, cauterized, and dissected. The cardinal ligaments were further grasped, dissected, and suture ligated, again with the LigaSure machine. The uterine corpus was then amputated. At that point, the LigaSure machine instrument was stopped and straight Zeppelin clamps were used on the cardinal ligaments down towards the uterosacral ligaments. The cardinal ligaments were grasped, dissected with a scalpel and then ligated with transfixion sutures with #1 Vicryl suture down to the uterosacral ligaments. The uterosacral ligaments were grasped, dissected, and suture ligated again with #1 Vicryl suture and transfixion sutures. At that time, the bladder had been pushed over the vagina and at this time right-angle Zeppelin clamps were placed on the vagina at the level of the cervix, and using the Patty scissors, the cervix was dissected away from the vagina. At this time, the vaginal cuff was then closed using interrupted sutures of #1 Vicryl suture from the midline to each lateral corner. After the good hemostasis had been achieved in the vaginal cuff, both the right and left adnexa was visualized and no more bleeding was noted. Retractor and all packing was removed from the abdomen. The fascia was approximated with running sutures of 0-Vicryl. Lavage was again carried out. Hemostasis was observed. The skin was approximated with 3-0 Vicryl in a subcuticular fashion.     Instrument, sponge, and needle counts were correct prior to abdominal closure and at the conclusion of the case.        Findings:  Fibroid uterus, 20 week size    Estimated Blood Loss:  150 ml           Specimens: uterus and tubes           Complications:  None; patient tolerated the procedure well. Disposition: PACU - hemodynamically stable.            Condition: stable    Maria T Rodriguez MD, PGY-4  [facility] Obstetrics and Gynecology   Pager: [phone]  8/20/2018 12:44 PM

## 2018-08-21 LAB
ANION GAP SERPL CALC-SCNC: 10 MMOL/L (ref 3–18)
BUN SERPL-MCNC: 10 MG/DL (ref 7–18)
BUN/CREAT SERPL: 12 (ref 12–20)
CALCIUM SERPL-MCNC: 8.2 MG/DL (ref 8.5–10.1)
CHLORIDE SERPL-SCNC: 102 MMOL/L (ref 100–108)
CO2 SERPL-SCNC: 27 MMOL/L (ref 21–32)
CREAT SERPL-MCNC: 0.81 MG/DL (ref 0.6–1.3)
GLUCOSE SERPL-MCNC: 114 MG/DL (ref 74–99)
POTASSIUM SERPL-SCNC: 3.6 MMOL/L (ref 3.5–5.5)
SODIUM SERPL-SCNC: 139 MMOL/L (ref 136–145)

## 2018-08-21 PROCEDURE — 74011000258 HC RX REV CODE- 258: Performed by: OBSTETRICS & GYNECOLOGY

## 2018-08-21 PROCEDURE — 74011250636 HC RX REV CODE- 250/636: Performed by: OBSTETRICS & GYNECOLOGY

## 2018-08-21 PROCEDURE — 74011250637 HC RX REV CODE- 250/637: Performed by: OBSTETRICS & GYNECOLOGY

## 2018-08-21 PROCEDURE — 65270000029 HC RM PRIVATE

## 2018-08-21 PROCEDURE — 36415 COLL VENOUS BLD VENIPUNCTURE: CPT | Performed by: OBSTETRICS & GYNECOLOGY

## 2018-08-21 PROCEDURE — 80048 BASIC METABOLIC PNL TOTAL CA: CPT | Performed by: OBSTETRICS & GYNECOLOGY

## 2018-08-21 RX ADMIN — DOCUSATE SODIUM 100 MG: 100 CAPSULE, LIQUID FILLED ORAL at 09:03

## 2018-08-21 RX ADMIN — OXYCODONE HYDROCHLORIDE AND ACETAMINOPHEN 1 TABLET: 7.5; 325 TABLET ORAL at 04:58

## 2018-08-21 RX ADMIN — HYDROMORPHONE HYDROCHLORIDE 1 MG: 1 INJECTION, SOLUTION INTRAMUSCULAR; INTRAVENOUS; SUBCUTANEOUS at 00:17

## 2018-08-21 RX ADMIN — OXYCODONE HYDROCHLORIDE AND ACETAMINOPHEN 1 TABLET: 7.5; 325 TABLET ORAL at 16:43

## 2018-08-21 RX ADMIN — HYDROCHLOROTHIAZIDE 25 MG: 25 TABLET ORAL at 09:02

## 2018-08-21 RX ADMIN — OXYCODONE HYDROCHLORIDE AND ACETAMINOPHEN 1 TABLET: 7.5; 325 TABLET ORAL at 09:02

## 2018-08-21 RX ADMIN — AMLODIPINE BESYLATE 10 MG: 10 TABLET ORAL at 09:03

## 2018-08-21 RX ADMIN — PROMETHAZINE HYDROCHLORIDE 12.5 MG: 25 INJECTION INTRAMUSCULAR; INTRAVENOUS at 18:57

## 2018-08-21 RX ADMIN — LOSARTAN POTASSIUM 100 MG: 50 TABLET ORAL at 09:02

## 2018-08-21 RX ADMIN — OXYCODONE HYDROCHLORIDE AND ACETAMINOPHEN 1 TABLET: 7.5; 325 TABLET ORAL at 13:03

## 2018-08-21 RX ADMIN — DOCUSATE SODIUM 100 MG: 100 CAPSULE, LIQUID FILLED ORAL at 18:48

## 2018-08-21 RX ADMIN — HYDROMORPHONE HYDROCHLORIDE 1 MG: 1 INJECTION, SOLUTION INTRAMUSCULAR; INTRAVENOUS; SUBCUTANEOUS at 21:38

## 2018-08-21 RX ADMIN — HYDROMORPHONE HYDROCHLORIDE 1 MG: 1 INJECTION, SOLUTION INTRAMUSCULAR; INTRAVENOUS; SUBCUTANEOUS at 14:17

## 2018-08-21 NOTE — PROGRESS NOTES
Problem: Falls - Risk of  Goal: *Absence of Falls  Document Rissa Fall Risk and appropriate interventions in the flowsheet.    Outcome: Progressing Towards Goal  Fall Risk Interventions:  Mobility Interventions: Patient to call before getting OOB         Medication Interventions: Patient to call before getting OOB, Teach patient to arise slowly    Elimination Interventions: Call light in reach, Patient to call for help with toileting needs

## 2018-08-21 NOTE — PROGRESS NOTES
a  Post op surgery note    Patient: Tish Laurent MRN: [id number]  SSN: [id number]    YOB: 1975  Age: 40 y.o. Sex: female        Preop Diagnosis: d25.9 fibroids    Postop Diagnosis:d25.9 fibroids    Procedure:Procedure(s):  total  ABDOMINAL  HYSTERECTOMY BILATERAL SALPINGECTOMY    Historical Additional  Problem List.:   Problem List as of 2018  Date Reviewed: 7/3/2018          Codes Class Noted - Resolved    Fibroids ICD-10-CM: D25.9  ICD-9-CM: 218.9  2018 - Present        Essential hypertension with goal blood pressure less than 140/90 ICD-10-CM: I10  ICD-9-CM: 401.9  2016 - Present        Anxiety ICD-10-CM: F41.9  ICD-9-CM: 300.00  2015 - Present        Phobia, flying ICD-10-CM: F40.243  ICD-9-CM: 300.29  2015 - Present        Hypertension ICD-10-CM: I10  ICD-9-CM: 401.9  2015 - Present        Rectal bleeding ICD-10-CM: K62.5  ICD-9-CM: 569.3  2014 - Present        RESOLVED: Elevated blood pressure (not hypertension) ICD-10-CM: R03.0  ICD-9-CM: 796.2  6/10/2014 - 2016               POD: 1 Day Post-Op     Pt doing well. Pain controlled with Percocet, but IV Dilaudid helped after ambulated in halls. Tolerating regular diet, +flatus, voiding without difficulty. Denies CP, SOB, N/V/D, VB. Blood pressure 123/78, pulse 77, temperature 98.5 °F (36.9 °C), resp. rate 17, height 5' 9\" (1.753 m), weight 112 kg (247 lb), last menstrual period 2018, SpO2 94 %.       Temp (24hrs), Av.3 °F (36.8 °C), Min:98 °F (36.7 °C), Max:98.6 °F (37 °C)      WBC   Date/Time Value Ref Range Status   2018 05:37 PM 15.3 (H) 4.6 - 13.2 K/uL Final   2018 08:45 AM 6.1 4.6 - 13.2 K/uL Final   2018 12:29 PM 5.4 4.0 - 11.0 K/uL Final     HGB   Date/Time Value Ref Range Status   2018 05:37 PM 12.3 12.0 - 16.0 g/dL Final   2018 08:45 AM 12.6 12.0 - 16.0 g/dL Final   2018 12:29 PM 12.7 11.7 - 15.5 g/dL Final     PLATELET   Date/Time Value Ref Range Status   08/20/2018 05:37  135 - 420 K/uL Final       Physical Exam:  Gen:  A&O, NAD  Abd:  Soft, nttp, non distended, inc c/d/i, wearing abd binder  Ext:  SCDs on, no clubbing/cyanosis/edema/redness, nttp    Impression: 43yo POD#1 s/p RICHARD    Plan:   - Continue to monitor pain control overnight  - Continue IS and ambulation with assistance  - Possible Ayesha Oneal MD

## 2018-08-21 NOTE — PROGRESS NOTES
1945 Received Bedside and Verbal [facility]A report included the following information SBAR, Kardex, Intake/Output and MAR. Pt laying in the bed with several family member at the bedside a&o denied acute distress and none noted. Family  Remain at the bedside call bell within pt reach. 80 Pt remain stable denied acute distress call bell within reach. 0017 Medicated for pain, complained urge to urinate assessed cabrera appears not draining well, pt assisted to standing position at the bedside and urine start flowing to drainage bag about 350 mL. Per pt feel much better cabrera to be removed 0600 pt aware. Call bell within reach no acute distress. 8292 Cabrera cath dc'd pt tolerated without complaint. 0725 Bedside and Verbal shift change report given to Pavithra Pace RN (oncoming nurse) by Italia Mcwilliams RN (offgoing nurse). Report included the following information SBAR, Kardex, Intake/Output, MAR and Recent Results.

## 2018-08-21 NOTE — PROGRESS NOTES
Gynecology Progress Note    Patient doing well post-op day 1 from Procedure(s):  total  ABDOMINAL  HYSTERECTOMY BILATERAL SALPINGECTOMY without significant complaints. Pain controlled on current medication. Chandra in place. Patient is not passing flatus. Patient has ambulated. Able to tolerate applesauce and crackers. Vitals:  Blood pressure 152/67, pulse 83, temperature 98.6 °F (37 °C), resp. rate 17, height 5' 9\" (1.753 m), weight 112 kg (247 lb), last menstrual period 2018, SpO2 93 %. Temp (24hrs), Av.5 °F (36.9 °C), Min:97.9 °F (36.6 °C), Max:100.3 °F (37.9 °C)        Exam:  Patient without distress. Abdomen soft,  nontender. Incision dry and clean without erythema. Lower extremities are negative for swelling, cords, or tenderness. Labs:   Recent Results (from the past 24 hour(s))   HCG URINE, QL. - POC    Collection Time: 18  8:27 AM   Result Value Ref Range    Pregnancy test,urine (POC) NEGATIVE  NEG     HCG QL SERUM    Collection Time: 18  8:45 AM   Result Value Ref Range    HCG, Ql. NEGATIVE  NEG     BETA HCG, QT    Collection Time: 18  8:45 AM   Result Value Ref Range    Beta HCG, QT <1 0 - 10 MIU/ML   CBC WITH AUTOMATED DIFF    Collection Time: 18  8:45 AM   Result Value Ref Range    WBC 6.1 4.6 - 13.2 K/uL    RBC 4.51 4.20 - 5.30 M/uL    HGB 12.6 12.0 - 16.0 g/dL    HCT 37.6 35.0 - 45.0 %    MCV 83.4 74.0 - 97.0 FL    MCH 27.9 24.0 - 34.0 PG    MCHC 33.5 31.0 - 37.0 g/dL    RDW 16.9 (H) 11.6 - 14.5 %    PLATELET 231 826 - 100 K/uL    MPV 10.9 9.2 - 11.8 FL    NEUTROPHILS 65 40 - 73 %    LYMPHOCYTES 25 21 - 52 %    MONOCYTES 7 3 - 10 %    EOSINOPHILS 2 0 - 5 %    BASOPHILS 1 0 - 2 %    ABS. NEUTROPHILS 4.0 1.8 - 8.0 K/UL    ABS. LYMPHOCYTES 1.5 0.9 - 3.6 K/UL    ABS. MONOCYTES 0.4 0.05 - 1.2 K/UL    ABS. EOSINOPHILS 0.1 0.0 - 0.4 K/UL    ABS.  BASOPHILS 0.1 0.0 - 0.1 K/UL    DF AUTOMATED     CBC WITH AUTOMATED DIFF Collection Time: 08/20/18  5:37 PM   Result Value Ref Range    WBC 15.3 (H) 4.6 - 13.2 K/uL    RBC 4.42 4.20 - 5.30 M/uL    HGB 12.3 12.0 - 16.0 g/dL    HCT 36.7 35.0 - 45.0 %    MCV 83.0 74.0 - 97.0 FL    MCH 27.8 24.0 - 34.0 PG    MCHC 33.5 31.0 - 37.0 g/dL    RDW 16.8 (H) 11.6 - 14.5 %    PLATELET 889 499 - 839 K/uL    MPV 11.4 9.2 - 11.8 FL    NEUTROPHILS 93 (H) 40 - 73 %    LYMPHOCYTES 5 (L) 21 - 52 %    MONOCYTES 2 (L) 3 - 10 %    EOSINOPHILS 0 0 - 5 %    BASOPHILS 0 0 - 2 %    ABS. NEUTROPHILS 14.2 (H) 1.8 - 8.0 K/UL    ABS. LYMPHOCYTES 0.8 (L) 0.9 - 3.6 K/UL    ABS. MONOCYTES 0.3 0.05 - 1.2 K/UL    ABS. EOSINOPHILS 0.0 0.0 - 0.4 K/UL    ABS. BASOPHILS 0.0 0.0 - 0.1 K/UL    DF AUTOMATED     METABOLIC PANEL, BASIC    Collection Time: 08/21/18  5:10 AM   Result Value Ref Range    Sodium 139 136 - 145 mmol/L    Potassium 3.6 3.5 - 5.5 mmol/L    Chloride 102 100 - 108 mmol/L    CO2 27 21 - 32 mmol/L    Anion gap 10 3.0 - 18 mmol/L    Glucose 114 (H) 74 - 99 mg/dL    BUN 10 7.0 - 18 MG/DL    Creatinine 0.81 0.6 - 1.3 MG/DL    BUN/Creatinine ratio 12 12 - 20      GFR est AA >60 >60 ml/min/1.73m2    GFR est non-AA >60 >60 ml/min/1.73m2    Calcium 8.2 (L) 8.5 - 10.1 MG/DL       Assessment and Plan:    Post-op day 1 from Procedure(s): TOTAL  ABDOMINAL  HYSTERECTOMY BILATERAL SALPINGECTOMY     1. Continue routine post-op care. -Post op Hgb 12. 6   -Remove cabrera this AM   -Ambulate   -Advance diet   -Await return of bowel function   -Continue PO meds for pain control.   -Anticipate discharge 24-48 hours if post operative goals met.     Sisi Baxter MD, PGY-4 [facility]

## 2018-08-21 NOTE — PROGRESS NOTES
conducted an initial consultation and Spiritual Assessment for Tish Laurent, who is a 40 y.o.,female. Patients Primary Language is: [place]. According to the patients EMR Church Affiliation is: No Sabianist. The reason the Patient came to the hospital is:   Patient Active Problem List    Diagnosis Date Noted    Fibroids 08/20/2018    Essential hypertension with goal blood pressure less than 140/90 06/14/2016    Anxiety 12/09/2015    Phobia, flying 12/09/2015    Hypertension 04/20/2015    Rectal bleeding 08/13/2014        The  provided the following Interventions:  Initiated a relationship of care and support. Explored issues of lisa, belief, spirituality and Christianity/ritual needs while hospitalized. Listened empathically. Provided information about Spiritual Care Services. Offered prayer and assurance of continued prayers on patient's behalf. Chart reviewed. The following outcomes were achieved:  Patient shared limited information about both their medical narrative and spiritual journey/beliefs. Patient processed feeling about current hospitalization. Patient expressed gratitude for 's visit. Assessment:  Patient does not have any Christianity/cultural needs that will affect patients preferences in health care. There are no further spiritual or Christianity issues which require intervention at this time. Plan:  Chaplains will continue to follow and will provide pastoral care on an as needed/requested basis.  recommends bedside caregivers page  on duty if patient shows signs of acute spiritual or emotional distress. Niurka Dye M.Div.   [facility] [address]  [phone]

## 2018-08-21 NOTE — PROGRESS NOTES
Reason for Admission:   [facility]                   RRAT Score:          4           Plan for utilizing home health:    no                      Likelihood of Readmission:  low                         Transition of Care Plan:    Spoke with pt, lives with son. Designates her mother for dcp. Independent with adls and amb. No dme. pcp dr Black Schwartz, last seen in July. Family to transport home. Plan home, no needs anticipated. Patient has designated ________mother________________ to participate in his/her discharge plan and to receive any needed information. Name: Johana Youssef  Address:  Phone number:[phone]    Care Management Interventions  PCP Verified by CM: Yes  Palliative Care Criteria Met (RRAT>21 & CHF Dx)?: No  Mode of Transport at Discharge: Other (see comment)  Transition of Care Consult (CM Consult): Discharge Planning  Discharge Durable Medical Equipment: No  Physical Therapy Consult: No  Occupational Therapy Consult: No  Speech Therapy Consult: No  Current Support Network:  Other  Confirm Follow Up Transport: Family  Plan discussed with Pt/Family/Caregiver: Yes  Discharge Location  Discharge Placement: Home

## 2018-08-21 NOTE — PROGRESS NOTES
0730 Received pt resting in bed, eyes closed, easily awaken. Pain under control. IV site on heplock, no sign of infiltration. Incision looks c/d/i. Not passing gas yet. Chandra just taken, DTV at 12nn. Per report pt had episode of nausea last night, phenergan given, has not complained of such again since then. Able to tolerate PO meds and food. Ambulating in the room per report. Skin is intact. Encouraged walking and OOB to chair. 0900 Per pt she noticed light pink coming from her vagina. Fem pad and disp underwear given. Will monitor this. 1300 Pt had her lunch sitting on edge of bed.     1400 Pt ambulating to bathroom without assistance needed. Complaining of pain increasing even after Percocet given. Per pt, it's like a cramping pain. Encouraged ambulation an hour after pain medication is given, pt agreed so.    1500 Ambulated in the hallway, tolerated well. Refuses to use walker, partial assistance needed. 1830 Pain under control but insists Dilaudid working better. Incision c/d/i with binder on. Skin is intact, IV on heplock patent. Still with nausea,no vomiting. Pt passing gas. Ambulating to bathroom, ambulated in the hallway once.

## 2018-08-22 VITALS
WEIGHT: 247 LBS | SYSTOLIC BLOOD PRESSURE: 145 MMHG | HEART RATE: 91 BPM | RESPIRATION RATE: 18 BRPM | TEMPERATURE: 98.3 F | DIASTOLIC BLOOD PRESSURE: 83 MMHG | BODY MASS INDEX: 36.58 KG/M2 | HEIGHT: 69 IN | OXYGEN SATURATION: 97 %

## 2018-08-22 PROBLEM — D25.0 FIBROIDS, SUBMUCOSAL: Status: ACTIVE | Noted: 2018-08-20

## 2018-08-22 PROBLEM — D25.1 FIBROIDS, INTRAMURAL: Status: ACTIVE | Noted: 2018-08-22

## 2018-08-22 PROBLEM — D25.0 FIBROIDS, SUBMUCOSAL: Status: RESOLVED | Noted: 2018-08-20 | Resolved: 2018-08-22

## 2018-08-22 PROCEDURE — 74011000250 HC RX REV CODE- 250: Performed by: OBSTETRICS & GYNECOLOGY

## 2018-08-22 PROCEDURE — 74011250637 HC RX REV CODE- 250/637: Performed by: OBSTETRICS & GYNECOLOGY

## 2018-08-22 RX ORDER — POLYETHYLENE GLYCOL 3350 17 G/17G
17 POWDER, FOR SOLUTION ORAL DAILY
Status: DISCONTINUED | OUTPATIENT
Start: 2018-08-22 | End: 2018-08-22 | Stop reason: HOSPADM

## 2018-08-22 RX ADMIN — DOCUSATE SODIUM 100 MG: 100 CAPSULE, LIQUID FILLED ORAL at 08:20

## 2018-08-22 RX ADMIN — OXYCODONE HYDROCHLORIDE AND ACETAMINOPHEN 1 TABLET: 7.5; 325 TABLET ORAL at 01:59

## 2018-08-22 RX ADMIN — OXYCODONE HYDROCHLORIDE AND ACETAMINOPHEN 1 TABLET: 7.5; 325 TABLET ORAL at 10:52

## 2018-08-22 RX ADMIN — POLYETHYLENE GLYCOL 3350 17 G: 17 POWDER, FOR SOLUTION ORAL at 08:19

## 2018-08-22 RX ADMIN — AMLODIPINE BESYLATE 10 MG: 10 TABLET ORAL at 08:19

## 2018-08-22 RX ADMIN — OXYCODONE HYDROCHLORIDE AND ACETAMINOPHEN 1 TABLET: 7.5; 325 TABLET ORAL at 05:52

## 2018-08-22 RX ADMIN — HYDROCHLOROTHIAZIDE 25 MG: 25 TABLET ORAL at 08:20

## 2018-08-22 RX ADMIN — OXYCODONE HYDROCHLORIDE AND ACETAMINOPHEN 1 TABLET: 7.5; 325 TABLET ORAL at 14:53

## 2018-08-22 RX ADMIN — LOSARTAN POTASSIUM 100 MG: 50 TABLET ORAL at 08:19

## 2018-08-22 NOTE — ROUTINE PROCESS
I have reviewed discharge instructions with the patient. The patient verbalized understanding.   Patient on ride

## 2018-08-22 NOTE — PROGRESS NOTES
0740 Received pt resting in bed. Incision c/d/i with abdominal binder on. Skin is intact. No nausea a this time. IV on heplock, patent, c/d/i. Ambulated in the hallway this AM per report. Pt just had Percocet, BP elevated possibly due to pain, will recheck VS in a few mins. Passing gas, but complaining of gas pain. Encouraged more ambulation. Call bell within reach. 1100 Complaining of gas pain. Ambulated in the hallway, tolerated very well. Offered warm water to drink instead of ice cold. 1455 Ambulated in the hallway. 1700 Incision c/d/i with abdominal binder on. Ambulated 3x in the hallway, standby assist only. Tolerating very well. Per pt gas pain has been under control since she's been walking. Pain under control with Percocet. Voiding to bathroom. IV site no sign of infiltration, skin is intact. Passing gas, no BM. 6445 [facility]. IV dc'd.

## 2018-08-22 NOTE — DISCHARGE INSTRUCTIONS
DISCHARGE SUMMARY from Nurse    PATIENT INSTRUCTIONS:    After general anesthesia or intravenous sedation, for 24 hours or while taking prescription Narcotics:  · Limit your activities  · Do not drive and operate hazardous machinery  · Do not make important personal or business decisions  · Do  not drink alcoholic beverages  · If you have not urinated within 8 hours after discharge, please contact your surgeon on call. Report the following to your surgeon:  · Excessive pain, swelling, redness or odor of or around the surgical area  · Temperature over 100.5  · Nausea and vomiting lasting longer than 4 hours or if unable to take medications  · Any signs of decreased circulation or nerve impairment to extremity: change in color, persistent  numbness, tingling, coldness or increase pain  · Any questions    What to do at Home:  Recommended activity: Activity as tolerated,    *  Please give a list of your current medications to your Primary Care Provider. *  Please update this list whenever your medications are discontinued, doses are      changed, or new medications (including over-the-counter products) are added. *  Please carry medication information at all times in case of emergency situations. These are general instructions for a healthy lifestyle:    No smoking/ No tobacco products/ Avoid exposure to second hand smoke  Surgeon General's Warning:  Quitting smoking now greatly reduces serious risk to your health. Obesity, smoking, and sedentary lifestyle greatly increases your risk for illness    A healthy diet, regular physical exercise & weight monitoring are important for maintaining a healthy lifestyle    You may be retaining fluid if you have a history of heart failure or if you experience any of the following symptoms:  Weight gain of 3 pounds or more overnight or 5 pounds in a week, increased swelling in our hands or feet or shortness of breath while lying flat in bed.   Please call your doctor as soon as you notice any of these symptoms; do not wait until your next office visit. Recognize signs and symptoms of STROKE:    F-face looks uneven    A-arms unable to move or move unevenly    S-speech slurred or non-existent    T-time-call 911 as soon as signs and symptoms begin-DO NOT go       Back to bed or wait to see if you get better-TIME IS BRAIN. Warning Signs of HEART ATTACK     Call 911 if you have these symptoms:   Chest discomfort. Most heart attacks involve discomfort in the center of the chest that lasts more than a few minutes, or that goes away and comes back. It can feel like uncomfortable pressure, squeezing, fullness, or pain.  Discomfort in other areas of the upper body. Symptoms can include pain or discomfort in one or both arms, the back, neck, jaw, or stomach.  Shortness of breath with or without chest discomfort.  Other signs may include breaking out in a cold sweat, nausea, or lightheadedness. Don't wait more than five minutes to call 911 - MINUTES MATTER! Fast action can save your life. Calling 911 is almost always the fastest way to get lifesaving treatment. Emergency Medical Services staff can begin treatment when they arrive -- up to an hour sooner than if someone gets to the hospital by car. The discharge information has been reviewed with the patient. The patient verbalized understanding. Discharge medications reviewed with the patient and appropriate educational materials and side effects teaching were provided.   ___________________________________________________________________________________________________________________________________    [address]  [address] [facility]  [address] [facility]  [phone]    Name: [address] Record Number: [id number]   YOB: 1975  Today's Date: August 22, 2018      Admit date: 8/20/2018    Discharge Date:     Attending Physician: Keely Carver MD    Admission Diagnoses: d25.9 fibroids  Fibroids    Discharge Diagnoses:   Problem List as of 8/22/2018  Date Reviewed: 7/3/2018          Codes Class Noted - Resolved    * (Principal)Fibroids, intramural ICD-10-CM: D25.1  ICD-9-CM: 218.1  8/22/2018 - Present        Essential hypertension with goal blood pressure less than 140/90 ICD-10-CM: I10  ICD-9-CM: 401.9  6/14/2016 - Present        Anxiety ICD-10-CM: F41.9  ICD-9-CM: 300.00  12/9/2015 - Present        Phobia, flying ICD-10-CM: F40.243  ICD-9-CM: 300.29  12/9/2015 - Present        Hypertension ICD-10-CM: I10  ICD-9-CM: 401.9  4/20/2015 - Present        Rectal bleeding ICD-10-CM: K62.5  ICD-9-CM: 569.3  8/13/2014 - Present        RESOLVED: Fibroids, submucosal ICD-10-CM: D25.0  ICD-9-CM: 218.0  8/20/2018 - 8/22/2018        RESOLVED: Elevated blood pressure (not hypertension) ICD-10-CM: R03.0  ICD-9-CM: 796.2  6/10/2014 - 6/14/2016                  PROCEDURE: Procedure(s):  total  ABDOMINAL  HYSTERECTOMY BILATERAL SALPINGECTOMY                                       Patient Instructions:      Notify OUR OFFICE  IMMEDIATELY if any of the following occur:     You are unable to urinate. Urgency to urinate is not uncommon.  Excessive vaginal bleeding > 1 maxi pad an hour for more then 2 hours straight.  Temperature above 101.0° and / or chills.  You are nauseous and / or vomiting and you cannot hold down any fluids.  Your pain is not controlled with the pain medication prescribed. Special Considerations:      Do not drive for at least 24 hours after any  Procedure involving incisions and when  you are no longer taking narcotic pain medication and you are able to move and react without hesitation.  You may return to work in 6 weeks unless we discussed returning sooner. [x] Pelvic rest (nothing in the vagina) for 6 weeks. [x] No heavy lifting over 10 pounds & no strenuous exercise for 6 weeks.       [] Other instructions:           Please contact or office or go to the nearest Emergency Department / Urgent Care facility for any other medical questions or concerns. Patient Instructions:Continue routine post-op care with discharge home. Instructions given for activity, nutrition, and medications, as well as to call for signs of fever over 101, increasing pain or heavy bleeding, foul discharge, and pelvic rest for six weeks. Please call for an appointment  to the office in between 2 and 6 weeks. Activity: No sex, douching, or tampons for 6 weeks or as directed by your physician. No heavy lifting for 6 weeks. No driving while taking pain medication. Diet: Resume pre-hospital diet. Wound Care: Keep wound clean and dry, but may do warm moist  Soaks 2-3 times per day. No orders of the defined types were placed in this encounter. Signed By:  Montrell Lara MD     August 22, 2018      DISCHARGE SUMMARY from Nurse    PATIENT INSTRUCTIONS:    After general anesthesia or intravenous sedation, for 24 hours or while taking prescription Narcotics:  · Limit your activities  · Do not drive and operate hazardous machinery  · Do not make important personal or business decisions  · Do  not drink alcoholic beverages  · If you have not urinated within 8 hours after discharge, please contact your surgeon on call. Report the following to your surgeon:  · Excessive pain, swelling, redness or odor of or around the surgical area  · Temperature over 100.5  · Nausea and vomiting lasting longer than 4 hours or if unable to take medications  · Any signs of decreased circulation or nerve impairment to extremity: change in color, persistent  numbness, tingling, coldness or increase pain  · Any questions    What to do at Home:  Recommended activity: No lifting, Driving, or Strenuous exercise until cleared by surgeon. See surgeon's instructions. If you experience any of the symptoms above, please follow up with Dr. Suzan Murguia.     *  Please give a list of your current medications to your Primary Care Provider. *  Please update this list whenever your medications are discontinued, doses are      changed, or new medications (including over-the-counter products) are added. *  Please carry medication information at all times in case of emergency situations. These are general instructions for a healthy lifestyle:    No smoking/ No tobacco products/ Avoid exposure to second hand smoke  Surgeon General's Warning:  Quitting smoking now greatly reduces serious risk to your health. Obesity, smoking, and sedentary lifestyle greatly increases your risk for illness    A healthy diet, regular physical exercise & weight monitoring are important for maintaining a healthy lifestyle    You may be retaining fluid if you have a history of heart failure or if you experience any of the following symptoms:  Weight gain of 3 pounds or more overnight or 5 pounds in a week, increased swelling in our hands or feet or shortness of breath while lying flat in bed. Please call your doctor as soon as you notice any of these symptoms; do not wait until your next office visit. Recognize signs and symptoms of STROKE:    F-face looks uneven    A-arms unable to move or move unevenly    S-speech slurred or non-existent    T-time-call 911 as soon as signs and symptoms begin-DO NOT go       Back to bed or wait to see if you get better-TIME IS BRAIN. Warning Signs of HEART ATTACK     Call 911 if you have these symptoms:   Chest discomfort. Most heart attacks involve discomfort in the center of the chest that lasts more than a few minutes, or that goes away and comes back. It can feel like uncomfortable pressure, squeezing, fullness, or pain.  Discomfort in other areas of the upper body. Symptoms can include pain or discomfort in one or both arms, the back, neck, jaw, or stomach.  Shortness of breath with or without chest discomfort.    Other signs may include breaking out in a cold sweat, nausea, or lightheadedness. Don't wait more than five minutes to call 911 - MINUTES MATTER! Fast action can save your life. Calling 911 is almost always the fastest way to get lifesaving treatment. Emergency Medical Services staff can begin treatment when they arrive -- up to an hour sooner than if someone gets to the hospital by car. The discharge information has been reviewed with the patient. The patient verbalized understanding. Discharge medications reviewed with the patient and appropriate educational materials and side effects teaching were provided. Patient armband removed and shredded.   ___________________________________________________________________________________________________________________________________

## 2018-08-22 NOTE — DISCHARGE SUMMARY
Gynecology Discharge Note          Name: Ann Szymanski   Medical Record Number: [id number]   YOB: 1975  Today's Date: 2018  . Admit date: 2018    Discharge Date: 2018    Attending Physician: Geovanna Pettit MD    Admission Diagnoses: d25.9 fibroids  Fibroids    Post op Diagnosis: d25.9 fibroids    Historical Additional  Problem List.: Problem List as of 2018  Date Reviewed: 7/3/2018          Codes Class Noted - Resolved    * (Principal)Fibroids, intramural ICD-10-CM: D25.1  ICD-9-CM: 218.1  2018 - Present        Essential hypertension with goal blood pressure less than 140/90 ICD-10-CM: I10  ICD-9-CM: 401.9  2016 - Present        Anxiety ICD-10-CM: F41.9  ICD-9-CM: 300.00  2015 - Present        Phobia, flying ICD-10-CM: F40.243  ICD-9-CM: 300.29  2015 - Present        Hypertension ICD-10-CM: I10  ICD-9-CM: 401.9  2015 - Present        Rectal bleeding ICD-10-CM: K62.5  ICD-9-CM: 569.3  2014 - Present        RESOLVED: Fibroids, submucosal ICD-10-CM: D25.0  ICD-9-CM: 218.0  2018 - 2018        RESOLVED: Elevated blood pressure (not hypertension) ICD-10-CM: R03.0  ICD-9-CM: 796.2  6/10/2014 - 2016                 Procedures: Procedure(s):  total  ABDOMINAL  HYSTERECTOMY BILATERAL SALPINGECTOMY  Patient doing well post-op     POD: 2 Days Post-Op  from Procedure(s):  total  ABDOMINAL  HYSTERECTOMY BILATERAL SALPINGECTOMY without significant complaints. Pain controlled on current medication. Voiding without difficulty. Patient bowel function is returning. Vitals:    Patient Vitals for the past 8 hrs:   BP Temp Pulse Resp SpO2   18 1507 145/83 98.3 °F (36.8 °C) 91 18 97 %   18 0939 (!) 144/94 - - - -     Temp (24hrs), Av.1 °F (36.7 °C), Min:97.9 °F (36.6 °C), Max:98.4 °F (36.9 °C)    Exam:   Per myself or nursing assessment:               Patient without distress.                Lungs clear               Abdomen soft, nontender. Incision dry and clean without erythema. Lower extremities are negative for swelling, cords, or tenderness. Lab/Data Review:      WBC   Date/Time Value Ref Range Status   08/20/2018 05:37 PM 15.3 (H) 4.6 - 13.2 K/uL Final   08/20/2018 08:45 AM 6.1 4.6 - 13.2 K/uL Final   06/12/2018 12:29 PM 5.4 4.0 - 11.0 K/uL Final     HGB   Date/Time Value Ref Range Status   08/20/2018 05:37 PM 12.3 12.0 - 16.0 g/dL Final   08/20/2018 08:45 AM 12.6 12.0 - 16.0 g/dL Final   06/12/2018 12:29 PM 12.7 11.7 - 15.5 g/dL Final     PLATELET   Date/Time Value Ref Range Status   08/20/2018 05:37  135 - 420 K/uL Final               Assessment and Plan:  Patient appears to be having uncomplicated post Procedure(s):  total  ABDOMINAL  HYSTERECTOMY BILATERAL SALPINGECTOMY      Medications: The patient has been given prescriptions at her preoperative visit for pain management  as directed by my office. You can take the more powerful narcotics as needed, but to decrease the use and switch to tylenol or motrin and may take as much as 800 mg of motrin three times a day if not combining with another NSAID. Course:  Yonatan Horta did well in her hospital course and seems to understand her instructions well and will follow up as we discussed. Patient Instructions:Continue routine post-op care. With discharge home.      Signed By:  Shanti Carreno MD     August 22, 2018

## 2018-08-22 NOTE — PROGRESS NOTES
Gynecology Progress Note    Patient doing well post-op day 2 from Procedure(s):  total  ABDOMINAL  HYSTERECTOMY BILATERAL SALPINGECTOMY. Pain controlled on current medication. Received IV dialudid 1mg over the last 12 hours. Voiding freely. Passing flatus. Patient has ambulated. Able to tolerate diet with some mild nausea overnight. Unsure if due to percocet. Concerned she had not yet had a bowel movement. Vitals:  Blood pressure 140/90, pulse 83, temperature 97.9 °F (36.6 °C), resp. rate 18, height 5' 9\" (1.753 m), weight 112 kg (247 lb), last menstrual period 2018, SpO2 93 %. Temp (24hrs), Av.1 °F (36.7 °C), Min:97.9 °F (36.6 °C), Max:98.5 °F (36.9 °C)        Exam:  Patient without distress. Abdomen soft,  Nontender, abdominal binder in place, +bs. Incision dry and clean without erythema. Lower extremities are negative for swelling, cords, or tenderness. Labs:   No results found for this or any previous visit (from the past 24 hour(s)). Assessment and Plan:    Post-op day 2 from Procedure(s): TOTAL  ABDOMINAL  HYSTERECTOMY BILATERAL SALPINGECTOMY     1. Continue routine post-op care. -Post op Hgb 12. 6   -Voiding freely   -Ambulate   -Continue regular diet   -Will add sennokot to bowel regimen.   -Continue PO meds for pain control. If nausea due to percocet will consider changing. States she has taken vicodin before with good results.   -Anticipate discharge later today or tomorrow if clinically doing well. I agree and will see later today but if no BM or more nausea may hold off on discharge for today.   Anna Handley MD, PGY-4 [facility]

## 2018-08-22 NOTE — PROGRESS NOTES
Problem: Falls - Risk of  Goal: *Absence of Falls  Document Rissa Fall Risk and appropriate interventions in the flowsheet.    Outcome: Progressing Towards Goal  Fall Risk Interventions:  Mobility Interventions: Patient to call before getting OOB, Utilize walker, cane, or other assistive device         Medication Interventions: Patient to call before getting OOB, Teach patient to arise slowly    Elimination Interventions: Call light in reach, Patient to call for help with toileting needs

## 2018-08-22 NOTE — PROGRESS NOTES
2034: Received patient  In bed awake,alert and oriented x 4. No signs of distress. Bed low and locked. Call bell within reach. Will continue monitoring. 0007: In bed resting quietly,no other concerns at this time. Call bell within reach. Will monitor. 8069: complain of gas,encourage to ambulate in the hallway, Assisted patient to abulate in the [address] in bed without any problems . Call bell within reach. Will continue monitoring.

## 2018-08-22 NOTE — ROUTINE PROCESS
Bedside and Verbal shift change report given to Alexa Rivera (oncoming nurse) by Rhys Stiles (offgoing nurse). Report included the following information SBAR, Kardex, MAR and Recent Results.

## 2018-08-28 ENCOUNTER — OFFICE VISIT (OUTPATIENT)
Dept: FAMILY MEDICINE CLINIC | Age: 43
End: 2018-08-28

## 2018-08-28 VITALS
RESPIRATION RATE: 18 BRPM | TEMPERATURE: 97.7 F | OXYGEN SATURATION: 100 % | SYSTOLIC BLOOD PRESSURE: 145 MMHG | HEIGHT: 69 IN | WEIGHT: 231.4 LBS | HEART RATE: 99 BPM | DIASTOLIC BLOOD PRESSURE: 95 MMHG | BODY MASS INDEX: 34.27 KG/M2

## 2018-08-28 DIAGNOSIS — F41.9 ANXIETY: ICD-10-CM

## 2018-08-28 DIAGNOSIS — Z90.710 S/P ABDOMINAL HYSTERECTOMY: Primary | ICD-10-CM

## 2018-08-28 DIAGNOSIS — F40.243 PHOBIA, FLYING: ICD-10-CM

## 2018-08-28 RX ORDER — ALPRAZOLAM 2 MG/1
2 TABLET ORAL
Qty: 15 TAB | Refills: 0 | Status: SHIPPED | OUTPATIENT
Start: 2018-08-28

## 2018-08-28 RX ORDER — OXYCODONE AND ACETAMINOPHEN 5; 325 MG/1; MG/1
1 TABLET ORAL
Qty: 15 TAB | Refills: 0 | Status: SHIPPED | OUTPATIENT
Start: 2018-08-28

## 2018-08-28 NOTE — PROGRESS NOTES
1. Have you been to the ER, urgent care clinic since your last visit? Hospitalized since your last visit? Yes. [facility] on 8/20/18       2. Have you seen or consulted any other health care providers outside of the [address] [place] [address] since your last visit? Include any pap smears or colon screening.  No     Patient presents for refill request.    Hospital admission on 8/20 for hysterectomy @ [facility].

## 2018-08-28 NOTE — MR AVS SNAPSHOT
[address] [address] 
[phone] Patient: Dexter Andre MRN: [id number] PMB:[birth year] Visit Information Date & Time Provider Department Dept. Phone Encounter #  
 8/28/2018 11:00 AM [facility] [address]  [id number] Upcoming Health Maintenance Date Due DTaP/Tdap/Td series (1 - Tdap) 9/8/1997 Influenza Age 5 to Adult 8/1/2018 BREAST CANCER SCRN MAMMOGRAM 7/16/2019 COLONOSCOPY 9/23/2019 PAP AKA CERVICAL CYTOLOGY 6/19/2020 Allergies as of 8/28/2018  Review Complete On: 8/28/2018 By: Rae Garcia LPN Severity Noted Reaction Type Reactions Lisinopril  06/22/2015    Cough Current Immunizations  Never Reviewed No immunizations on file. Not reviewed this visit You Were Diagnosed With   
  
 Codes Comments S/P abdominal hysterectomy    -  Primary ICD-10-CM: Z90.710 ICD-9-CM: V88.01 Anxiety     ICD-10-CM: F41.9 ICD-9-CM: 300.00 Phobia, flying     ICD-10-CM: K80.251 ICD-9-CM: 300.29 Vitals BP Pulse Temp Resp Height(growth percentile) Weight(growth percentile) (!) 145/95 (BP 1 Location: Right arm, BP Patient Position: Sitting) 99 97.7 °F (36.5 °C) (Oral) 18 5' 9\" (1.753 m) 231 lb 6.4 oz (105 kg) LMP SpO2 BMI OB Status Smoking Status 08/03/2018 100% 34.17 kg/m2 Having regular periods Never Smoker BMI and BSA Data Body Mass Index Body Surface Area  
 34.17 kg/m 2 2.26 m 2 Preferred Pharmacy Pharmacy Name Phone [facility] [phone] [place] [place] [address] [phone] Your Updated Medication List  
  
   
This list is accurate as of 8/28/18 11:29 AM.  Always use your most recent med list.  
  
  
  
  
 ALPRAZolam 2 mg tablet Commonly known as:  Dana Arroyo Take 1 Tab by mouth two (2) times daily as needed. Max Daily Amount: 4 mg. Indications: anxiety, Generalized Anxiety Disorder, Panic Disorder  
  
 amLODIPine 10 mg tablet Commonly known as:  Guthrie Mend Take 1 Tab by mouth daily. hydroCHLOROthiazide 25 mg tablet Commonly known as:  HYDRODIURIL Take 1 Tab by mouth daily. losartan 100 mg tablet Commonly known as:  COZAAR Take 1 Tab by mouth daily. naproxen sodium 550 mg tablet Commonly known as:  Ralf Mass TAKE 1 TABLET BY MOUTH TWICE DAILY WITH MEALS  
  
 oxyCODONE-acetaminophen 5-325 mg per tablet Commonly known as:  PERCOCET Take 1 Tab by mouth every four (4) hours as needed for Pain. Max Daily Amount: 6 Tabs. Prescriptions Printed Refills  
 oxyCODONE-acetaminophen (PERCOCET) 5-325 mg per tablet 0 Sig: Take 1 Tab by mouth every four (4) hours as needed for Pain. Max Daily Amount: 6 Tabs. Class: Print Route: Oral  
 ALPRAZolam (XANAX) 2 mg tablet 0 Sig: Take 1 Tab by mouth two (2) times daily as needed. Max Daily Amount: 4 mg. Indications: anxiety, Generalized Anxiety Disorder, Panic Disorder Class: Print Route: Oral  
  
Introducing [facility]! Dear Shana Romo: Thank you for requesting a [employer] account. Our records indicate that you already have an active [employer] account. You can access your account anytime at [email or web address] Did you know that you can access your hospital and ER discharge instructions at any time in [employer]? You can also review all of your test results from your hospital stay or ER visit. Additional Information If you have questions, please visit the Frequently Asked Questions section of the [employer] website at [email or web address] Remember, [employer] is NOT to be used for urgent needs. For medical emergencies, dial 911. Now available from your iPhone and Android! Please provide this summary of care documentation to your next provider. Your primary care clinician is listed as Nicole Rosenthal. If you have any questions after today's visit, please call [phone].

## 2018-08-28 NOTE — PROGRESS NOTES
Subjective:     HPI:  Justine Flores is a 40 y.o. female who presents to the office today for routine care and medication refills. Post op pain/anxiety:  Pt report she was recently discharged from the hospital 08/21/18 after undergoing a total abdominal hysterectomy. She reports she was given hydrocodone and ibuprofen. Pt states that she was told to take her prescribed Xanax BID by the doctor in the hospital. She states that her anxiety was increased during her post op recovery period. She states this was worsened because she wasn't having bowel movements. She stated that she took a laxative and a stool softener which caused her to have bowel movement. She reports that she felt less anxious after she was able to achieve a bowel movement. Pt reports that the laxatives and stool softeners she has been taking have provided relief from her constipation and she is having regular BM's. She states that she is in a lot of pain, and that the pain is worse at night and when she first awakes in the morning. She reports she will return to see her surgeon on 10/01/2018. Of note, pt reports she is going to [place] to visit her granddaughter when she gets cleared from surgery to travel. She is aware that flying to [facility] can lead to blood clots. Current Outpatient Prescriptions   Medication Sig Dispense Refill    oxyCODONE-acetaminophen (PERCOCET) 5-325 mg per tablet Take 1 Tab by mouth every four (4) hours as needed for Pain. Max Daily Amount: 6 Tabs. 15 Tab 0    ALPRAZolam (XANAX) 2 mg tablet Take 1 Tab by mouth two (2) times daily as needed. Max Daily Amount: 4 mg. Indications: anxiety, Generalized Anxiety Disorder, Panic Disorder 15 Tab 0    naproxen sodium (ANAPROX) 550 mg tablet TAKE 1 TABLET BY MOUTH TWICE DAILY WITH MEALS 180 Tab 2    hydroCHLOROthiazide (HYDRODIURIL) 25 mg tablet Take 1 Tab by mouth daily. 90 Tab 1    losartan (COZAAR) 100 mg tablet Take 1 Tab by mouth daily.  90 Tab 1    amLODIPine (NORVASC) 10 mg tablet Take 1 Tab by mouth daily. 90 Tab 1        Allergies   Allergen Reactions    Lisinopril Cough       Past Medical History:   Diagnosis Date    Anxiety     Elevated blood pressure (not hypertension) 6/10/2014    Fibroids, intramural 8/22/2018    Hypertension     Rectal bleeding         Past Surgical History:   Procedure Laterality Date    COLONOSCOPY  9/24/2014 Return 9/25/2019    Dr. Mahsa Kaufman; dx    HX BREAST LUMPECTOMY Right     benign.  HX COLONOSCOPY  age 45    rectal bleeding hemorrhoids found. f/u age 49 or sooner if bleeding. Family History   Problem Relation Age of Onset    Hypertension Mother     Hypertension Father     Hypertension Other     Diabetes Maternal Grandmother     Stroke Maternal Grandmother     Cancer Maternal Grandmother      Kidney CA    Diabetes Maternal Grandfather     Cancer Maternal Grandfather     Cancer Paternal Grandmother      Breast CA, age 52's    Breast Cancer Paternal Grandmother     Cancer Other 39       Social History     Social History    Marital status: SINGLE     Spouse name: N/A    Number of children: N/A    Years of education: N/A     Occupational History    Not on file. Social History Main Topics    Smoking status: Never Smoker    Smokeless tobacco: Never Used    Alcohol use 0.0 oz/week      Comment: 1-3 glass per day    Drug use: Yes     Special: Marijuana      Comment: OCCAS USE--LAST USED EARLY AUG 2018    Sexual activity: Yes     Partners: Male     Birth control/ protection: None     Other Topics Concern     Service No    Blood Transfusions No    Weight Concern Yes    Exercise Yes    Seat Belt Yes    Self-Exams No     Social History Narrative       REVIEW OF SYSTEM:  Review of Systems   Constitutional: Negative for chills and fever. Eyes: Negative for blurred vision. Respiratory: Negative for shortness of breath. Cardiovascular: Negative for chest pain, palpitations and leg swelling. Gastrointestinal: Positive for abdominal pain and constipation. Negative for diarrhea, nausea and vomiting. Musculoskeletal: Negative for joint pain. Neurological: Negative for headaches. Psychiatric/Behavioral: The patient is nervous/anxious. Objective:     Visit Vitals    BP (!) 145/95 (BP 1 Location: Right arm, BP Patient Position: Sitting)    Pulse 99    Temp 97.7 °F (36.5 °C) (Oral)    Resp 18    Ht 5' 9\" (1.753 m)    Wt 231 lb 6.4 oz (105 kg)    LMP 08/03/2018  Comment: spotting over the weekend    SpO2 100%    BMI 34.17 kg/m2       PHYSICAL EXAM:  Physical Exam   Constitutional: She is oriented to person, place, and time and well-developed, well-nourished, and in no distress. HENT:   Right Ear: Tympanic membrane, external ear and ear canal normal.   Left Ear: Tympanic membrane, external ear and ear canal normal.   Nose: Nose normal.   Mouth/Throat: Oropharynx is clear and moist.   Eyes: Pupils are equal, round, and reactive to light. Neck: Normal range of motion. Neck supple. No thyromegaly present. Cardiovascular: Normal rate, regular rhythm, normal heart sounds and intact distal pulses. No murmur heard. Pulmonary/Chest: Effort normal. She has no wheezes. She has rhonchi ( Inspiratory ) in the right lower field and the left lower field. Neurological: She is alert and oriented to person, place, and time. GCS score is 15. Skin: Skin is warm and dry. Vitals reviewed. Assessment/Plan:       ICD-10-CM ICD-9-CM    1. S/P abdominal hysterectomy Z90.710 V88.01 oxyCODONE-acetaminophen (PERCOCET) 5-325 mg per tablet   2. Anxiety F41.9 300.00 ALPRAZolam (XANAX) 2 mg tablet   3. Phobia, flying F40.243 300.29 ALPRAZolam (XANAX) 2 mg tablet     Patient given opportunity to ask questions. Questions answered. Patient understands plan of care. Follow-up Disposition:  Return if symptoms worsen or fail to improve.     Written by Savanah Valles, as dictated by Shirlean Libman, DO.    I, Dr. Maldonado Meadows, confirm that all documentation is accurate.

## 2018-10-20 ENCOUNTER — HOSPITAL ENCOUNTER (EMERGENCY)
Age: 43
Discharge: HOME | End: 2018-10-20
Attending: EMERGENCY MEDICINE
Payer: COMMERCIAL

## 2018-10-20 VITALS
HEART RATE: 82 BPM | DIASTOLIC BLOOD PRESSURE: 115 MMHG | RESPIRATION RATE: 18 BRPM | TEMPERATURE: 98.6 F | SYSTOLIC BLOOD PRESSURE: 182 MMHG | OXYGEN SATURATION: 100 %

## 2018-10-20 DIAGNOSIS — N39.0 ACUTE UTI (URINARY TRACT INFECTION): Primary | ICD-10-CM

## 2018-10-20 LAB
APPEARANCE UR: ABNORMAL
BACTERIA URNS QL MICRO: ABNORMAL /HPF
BILIRUB UR QL: ABNORMAL
COLOR UR: ABNORMAL
EPITH CASTS URNS QL MICRO: ABNORMAL /LPF (ref 0–5)
GLUCOSE UR STRIP.AUTO-MCNC: NEGATIVE MG/DL
HCG UR QL: NEGATIVE
HGB UR QL STRIP: ABNORMAL
KETONES UR QL STRIP.AUTO: NEGATIVE MG/DL
LEUKOCYTE ESTERASE UR QL STRIP.AUTO: ABNORMAL
NITRITE UR QL STRIP.AUTO: POSITIVE
PH UR STRIP: 6 [PH] (ref 5–8)
PROT UR STRIP-MCNC: >300 MG/DL
RBC #/AREA URNS HPF: ABNORMAL /HPF (ref 0–5)
SP GR UR REFRACTOMETRY: 1.02 (ref 1–1.03)
UROBILINOGEN UR QL STRIP.AUTO: 0.2 EU/DL (ref 0.2–1)
WBC URNS QL MICRO: ABNORMAL /HPF (ref 0–4)

## 2018-10-20 PROCEDURE — 87077 CULTURE AEROBIC IDENTIFY: CPT | Performed by: NURSE PRACTITIONER

## 2018-10-20 PROCEDURE — 81025 URINE PREGNANCY TEST: CPT | Performed by: NURSE PRACTITIONER

## 2018-10-20 PROCEDURE — 87186 SC STD MICRODIL/AGAR DIL: CPT | Performed by: NURSE PRACTITIONER

## 2018-10-20 PROCEDURE — 99283 EMERGENCY DEPT VISIT LOW MDM: CPT

## 2018-10-20 PROCEDURE — 81001 URINALYSIS AUTO W/SCOPE: CPT | Performed by: NURSE PRACTITIONER

## 2018-10-20 PROCEDURE — 87086 URINE CULTURE/COLONY COUNT: CPT | Performed by: NURSE PRACTITIONER

## 2018-10-20 PROCEDURE — 74011250637 HC RX REV CODE- 250/637: Performed by: NURSE PRACTITIONER

## 2018-10-20 RX ORDER — CEPHALEXIN 500 MG/1
CAPSULE ORAL
Qty: 28 CAP | Refills: 0 | Status: SHIPPED | OUTPATIENT
Start: 2018-10-20

## 2018-10-20 RX ORDER — CEPHALEXIN 250 MG/1
1000 CAPSULE ORAL
Status: COMPLETED | OUTPATIENT
Start: 2018-10-20 | End: 2018-10-20

## 2018-10-20 RX ORDER — IBUPROFEN 800 MG/1
800 TABLET ORAL
Qty: 20 TAB | Refills: 0 | Status: SHIPPED | OUTPATIENT
Start: 2018-10-20 | End: 2018-10-27

## 2018-10-20 RX ADMIN — CEPHALEXIN 1000 MG: 250 CAPSULE ORAL at 19:37

## 2018-10-20 NOTE — DISCHARGE INSTRUCTIONS
Urinary Tract Infection in Women: Care Instructions  Your Care Instructions    A urinary tract infection, or UTI, is a general term for an infection anywhere between the kidneys and the urethra (where urine comes out). Most UTIs are bladder infections. They often cause pain or burning when you urinate. UTIs are caused by bacteria and can be cured with antibiotics. Be sure to complete your treatment so that the infection goes away. Follow-up care is a key part of your treatment and safety. Be sure to make and go to all appointments, and call your doctor if you are having problems. It's also a good idea to know your test results and keep a list of the medicines you take. How can you care for yourself at home? · Take your antibiotics as directed. Do not stop taking them just because you feel better. You need to take the full course of antibiotics. · Drink extra water and other fluids for the next day or two. This may help wash out the bacteria that are causing the infection. (If you have kidney, heart, or liver disease and have to limit fluids, talk with your doctor before you increase your fluid intake.)  · Avoid drinks that are carbonated or have caffeine. They can irritate the bladder. · Urinate often. Try to empty your bladder each time. · To relieve pain, take a hot bath or lay a heating pad set on low over your lower belly or genital area. Never go to sleep with a heating pad in place. To prevent UTIs  · Drink plenty of water each day. This helps you urinate often, which clears bacteria from your system. (If you have kidney, heart, or liver disease and have to limit fluids, talk with your doctor before you increase your fluid intake.)  · Urinate when you need to. · Urinate right after you have sex. · Change sanitary pads often. · Avoid douches, bubble baths, feminine hygiene sprays, and other feminine hygiene products that have deodorants.   · After going to the bathroom, wipe from front to back.  When should you call for help? Call your doctor now or seek immediate medical care if:    · Symptoms such as fever, chills, nausea, or vomiting get worse or appear for the first time.     · You have new pain in your back just below your rib cage. This is called flank pain.     · There is new blood or pus in your urine.     · You have any problems with your antibiotic medicine.    Watch closely for changes in your health, and be sure to contact your doctor if:    · You are not getting better after taking an antibiotic for 2 days.     · Your symptoms go away but then come back. Where can you learn more? Go to http://latoya-david.info/. Enter B439 in the search box to learn more about \"Urinary Tract Infection in Women: Care Instructions. \"  Current as of: March 21, 2018  Content Version: 11.8  © 5613-1108 [employer]. Care instructions adapted under license by [employer] (which disclaims liability or warranty for this information). If you have questions about a medical condition or this instruction, always ask your healthcare professional. [address] any warranty or liability for your use of this information. Female Urinary Tract: Anatomy Sketch    Current as of: October 6, 2017  Content Version: 11.8  © 0853-4256 [employer]. Care instructions adapted under license by [employer] (which disclaims liability or warranty for this information). If you have questions about a medical condition or this instruction, always ask your healthcare professional. [address] any warranty or liability for your use of this information. etaskr Activation    Thank you for requesting access to etaskr. Please follow the instructions below to securely access and download your online medical record.  etaskr allows you to send messages to your doctor, view your test results, renew your prescriptions, schedule appointments, and more. How Do I Sign Up? 1. In your internet browser, go to [email or web address]  2. Click on the First Time User? Click Here link in the Sign In box. You will be redirect to the New Member Sign Up page. 3. Enter your [employer] Access Code exactly as it appears below. You will not need to use this code after youve completed the sign-up process. If you do not sign up before the expiration date, you must request a new code. [employer] Access Code: Activation code not generated  Current [employer] Status: Active (This is the date your [employer] access code will )    4. Enter the last four digits of your Social Security Number (xxxx) and Date of Birth (mm/dd/yyyy) as indicated and click Submit. You will be taken to the next sign-up page. 5. Create a [employer] ID. This will be your [employer] login ID and cannot be changed, so think of one that is secure and easy to remember. 6. Create a [employer] password. You can change your password at any time. 7. Enter your Password Reset Question and Answer. This can be used at a later time if you forget your password. 8. Enter your e-mail address. You will receive e-mail notification when new information is available in [address] 9. Click Sign Up. You can now view and download portions of your medical record. 10. Click the Download Summary menu link to download a portable copy of your medical information. Additional Information    If you have questions, please visit the Frequently Asked Questions section of the [employer] website at [email or web address] Remember, [employer] is NOT to be used for urgent needs. For medical emergencies, dial 911.

## 2018-10-20 NOTE — ED PROVIDER NOTES
Patient came in complaining of having a UTI. She said that she has had them before in the past and knows what they feel like. Patient states that this started about 3 days ago and then she said its painful after she gets done urinating and that the pain \"lingers\" and that her pain level is an 8 out of 10. She denies fever, chills, nausea, vomiting, diarrhea, SOB, back pain, and CP. No other concerns or symptoms at this time. The history is provided by the patient. Past Medical History:  
Diagnosis Date  Anxiety  Elevated blood pressure (not hypertension) 6/10/2014  Fibroids, intramural 8/22/2018  Hypertension  Rectal bleeding Past Surgical History:  
Procedure Laterality Date  COLONOSCOPY  9/24/2014 Return 9/25/2019 Dr. Sherryle Knock; dx  
 HX BREAST LUMPECTOMY Right   
 benign.  HX COLONOSCOPY  age 45  
 rectal bleeding hemorrhoids found. f/u age 49 or sooner if bleeding. Family History:  
Problem Relation Age of Onset  Hypertension Mother  Hypertension Father  Hypertension Other  Diabetes Maternal Grandmother  Stroke Maternal Grandmother  Cancer Maternal Grandmother Kidney CA  Diabetes Maternal Grandfather  Cancer Maternal Grandfather  Cancer Paternal Grandmother Breast CA, age 52's  Breast Cancer Paternal Grandmother  Cancer Other 45 Social History Socioeconomic History  Marital status: SINGLE Spouse name: Not on file  Number of children: Not on file  Years of education: Not on file  Highest education level: Not on file Social Needs  Financial resource strain: Not on file  Food insecurity - worry: Not on file  Food insecurity - inability: Not on file  Transportation needs - medical: Not on file  Transportation needs - non-medical: Not on file Occupational History  Not on file Tobacco Use  Smoking status: Never Smoker  Smokeless tobacco: Never Used Substance and Sexual Activity  Alcohol use: Yes Alcohol/week: 0.0 oz  
  Comment: 1-3 glass per day  Drug use: Yes Types: Marijuana Comment: OCCAS USE--LAST USED EARLY AUG 2018  Sexual activity: Yes  
  Partners: Male Birth control/protection: None Other Topics Concern   Service No  
 Blood Transfusions No  
 Caffeine Concern Not Asked  Occupational Exposure Not Asked Brandon Joe Hazards Not Asked  Sleep Concern Not Asked  Stress Concern Not Asked  Weight Concern Yes  Special Diet Not Asked  Back Care Not Asked  Exercise Yes  Bike Helmet Not Asked [address] [facility] Yes  Self-Exams No  
Social History Narrative  Not on file ALLERGIES: Lisinopril Review of Systems Constitutional: Negative. Negative for chills and fever. HENT: Negative. Eyes: Negative. Respiratory: Negative. Negative for shortness of breath. Cardiovascular: Negative. Negative for chest pain. Gastrointestinal: Negative. Negative for diarrhea, nausea and vomiting. Endocrine: Negative. Genitourinary: Positive for dysuria. Musculoskeletal: Negative for back pain. Skin: Negative. Allergic/Immunologic: Negative. Neurological: Negative. Hematological: Negative. Psychiatric/Behavioral: Negative. All other systems reviewed and are negative. Vitals:  
 10/20/18 1756 Pulse: 93 Resp: 18 Temp: 98.6 °F (37 °C) SpO2: 100% Physical Exam  
Constitutional: She is oriented to person, place, and time. She appears well-developed and well-nourished. HENT:  
Head: Normocephalic. Eyes: Pupils are equal, round, and reactive to light. Neck: Normal range of motion. Neck supple. Cardiovascular: Normal heart sounds. Pulmonary/Chest: Effort normal and breath sounds normal.  
Abdominal: Soft. Genitourinary:  
Genitourinary Comments: No CVA tenderness on palpation Musculoskeletal: Normal range of motion. Neurological: She is alert and oriented to person, place, and time. Skin: Skin is warm and dry. Psychiatric: She has a normal mood and affect. Her behavior is normal.  
Nursing note and vitals reviewed. MDM Number of Diagnoses or Management Options Diagnosis management comments: MDM: Obtain urinalysis for evaluation. Winston Marroquin NP  7:23 PM 
 
 
 
Risk of Complications, Morbidity, and/or Mortality Presenting problems: low Diagnostic procedures: low Management options: low Patient Progress Patient progress: stable Procedures Scribe Attestation Brenda Babb acting as a scribe for and in the presence of Aura Stovall NP October 20, 2018 at 5:57 PM 
    
Provider Attestation:     
I personally performed the services described in the documentation, reviewed the documentation, as recorded by the scribe in my presence, and it accurately and completely records my words and actions. October 20, 2018 at 5:57 PM - Aura Stovall NP Diagnosis: 1. Acute UTI (urinary tract infection) Disposition:   Discharged to Home Follow-up Information Follow up With Specialties Details Why Contact Info Lynn Jennings DO Family Practice, Family Practice Call on Monday for follow up. [address] [id number] [place] [id number] [phone] Medication List  
  
START taking these medications   
cephALEXin 500 mg capsule Commonly known as:  Magdalen Virginia Take two caps PO in the AM;  Take two caps PO in the PM for seven days 
  
ibuprofen 800 mg tablet Commonly known as:  MOTRIN Take 1 Tab by mouth every eight (8) hours as needed for Pain for up to 7 days. ASK your doctor about these medications ALPRAZolam 2 mg tablet Commonly known as:  Jaci Bear Take 1 Tab by mouth two (2) times daily as needed. Max Daily Amount: 4 mg. Indications: anxiety, Generalized Anxiety Disorder, Panic Disorder 
  
amLODIPine 10 mg tablet Commonly known as:  Josefina Paget Take 1 Tab by mouth daily. hydroCHLOROthiazide 25 mg tablet Commonly known as:  HYDRODIURIL Take 1 Tab by mouth daily. losartan 100 mg tablet Commonly known as:  COZAAR Take 1 Tab by mouth daily. naproxen sodium 550 mg tablet Commonly known as:  Sarah Lucks TAKE 1 TABLET BY MOUTH TWICE DAILY WITH MEALS 
  
oxyCODONE-acetaminophen 5-325 mg per tablet Commonly known as:  PERCOCET Take 1 Tab by mouth every four (4) hours as needed for Pain. Max Daily Amount: 6 Tabs. Where to Get Your Medications Information about where to get these medications is not yet available Ask your nurse or doctor about these medications · cephALEXin 500 mg capsule · ibuprofen 800 mg tablet

## 2018-10-23 LAB
BACTERIA SPEC CULT: ABNORMAL
SERVICE CMNT-IMP: ABNORMAL

## 2018-10-24 ENCOUNTER — OFFICE VISIT (OUTPATIENT)
Dept: FAMILY MEDICINE CLINIC | Age: 43
End: 2018-10-24

## 2018-10-24 VITALS
SYSTOLIC BLOOD PRESSURE: 174 MMHG | DIASTOLIC BLOOD PRESSURE: 119 MMHG | WEIGHT: 240.8 LBS | RESPIRATION RATE: 18 BRPM | HEART RATE: 83 BPM | HEIGHT: 69 IN | BODY MASS INDEX: 35.66 KG/M2 | OXYGEN SATURATION: 96 % | TEMPERATURE: 96.9 F

## 2018-10-24 DIAGNOSIS — I10 ESSENTIAL HYPERTENSION: ICD-10-CM

## 2018-10-24 DIAGNOSIS — K12.1 ORAL ULCER: ICD-10-CM

## 2018-10-24 DIAGNOSIS — B37.31 CANDIDAL VAGINITIS: Primary | ICD-10-CM

## 2018-10-24 PROBLEM — E66.01 SEVERE OBESITY (HCC): Status: ACTIVE | Noted: 2018-10-24

## 2018-10-24 RX ORDER — METOPROLOL SUCCINATE 50 MG/1
TABLET, EXTENDED RELEASE ORAL
Qty: 90 TAB | Refills: 3 | Status: SHIPPED | OUTPATIENT
Start: 2018-10-24

## 2018-10-24 RX ORDER — LIDOCAINE HYDROCHLORIDE 20 MG/ML
15 SOLUTION OROPHARYNGEAL AS NEEDED
Qty: 1 BOTTLE | Refills: 1 | Status: SHIPPED | OUTPATIENT
Start: 2018-10-24

## 2018-10-24 RX ORDER — METOPROLOL SUCCINATE 50 MG/1
50 TABLET, EXTENDED RELEASE ORAL DAILY
Qty: 30 TAB | Refills: 3 | Status: SHIPPED | OUTPATIENT
Start: 2018-10-24 | End: 2018-10-24 | Stop reason: SDUPTHER

## 2018-10-24 RX ORDER — FLUCONAZOLE 150 MG/1
150 TABLET ORAL DAILY
Qty: 1 TAB | Refills: 0 | Status: SHIPPED | OUTPATIENT
Start: 2018-10-24 | End: 2018-10-25

## 2018-10-24 NOTE — PROGRESS NOTES
Maira French is a 44 y.o.  female and presents with     Chief Complaint   Patient presents with    Vaginal Itching    Gum Problem    Hypertension       Pt was seen in ER receently for cough and was given  Keflex. Pt says she devloped symptoms of yeast infection. Pt also noticed darkening of her lips and small ulcer in her mouth. Pt has been taking blood pressure meds but it has been high. NO headaches. Pt has lot of stress. She is going to school , works and also has kids. Past Medical History:   Diagnosis Date    Anxiety     Elevated blood pressure (not hypertension) 6/10/2014    Fibroids, intramural 8/22/2018    Hypertension     Rectal bleeding      Past Surgical History:   Procedure Laterality Date    COLONOSCOPY  9/24/2014 Return 9/25/2019    Dr. Jose Elias Shannon; dx    HX BREAST LUMPECTOMY Right     benign.  HX COLONOSCOPY  age 45    rectal bleeding hemorrhoids found. f/u age 49 or sooner if bleeding. Current Outpatient Medications   Medication Sig    fluconazole (DIFLUCAN) 150 mg tablet Take 1 Tab by mouth daily for 1 day. FDA advises cautious prescribing of oral fluconazole in pregnancy.  lidocaine (XYLOCAINE) 2 % solution Take 15 mL by mouth as needed for Pain.  metoprolol succinate (TOPROL-XL) 50 mg XL tablet Take 1 Tab by mouth daily.  cephALEXin (KEFLEX) 500 mg capsule Take two caps PO in the AM;  Take two caps PO in the PM for seven days    hydroCHLOROthiazide (HYDRODIURIL) 25 mg tablet Take 1 Tab by mouth daily.  losartan (COZAAR) 100 mg tablet Take 1 Tab by mouth daily.  amLODIPine (NORVASC) 10 mg tablet Take 1 Tab by mouth daily.  ibuprofen (MOTRIN) 800 mg tablet Take 1 Tab by mouth every eight (8) hours as needed for Pain for up to 7 days.  oxyCODONE-acetaminophen (PERCOCET) 5-325 mg per tablet Take 1 Tab by mouth every four (4) hours as needed for Pain. Max Daily Amount: 6 Tabs.     ALPRAZolam (XANAX) 2 mg tablet Take 1 Tab by mouth two (2) times daily as needed. Max Daily Amount: 4 mg. Indications: anxiety, Generalized Anxiety Disorder, Panic Disorder    naproxen sodium (ANAPROX) 550 mg tablet TAKE 1 TABLET BY MOUTH TWICE DAILY WITH MEALS     No current facility-administered medications for this visit. Health Maintenance   Topic Date Due    DTaP/Tdap/Td series (1 - Tdap) 09/08/1997    Influenza Age 5 to Adult  08/01/2018    BREAST CANCER SCRN MAMMOGRAM  07/16/2019    COLONOSCOPY  09/23/2019    PAP AKA CERVICAL CYTOLOGY  06/19/2020       There is no immunization history on file for this patient. Patient's last menstrual period was 08/03/2018. Allergies and Intolerances: Allergies   Allergen Reactions    Lisinopril Cough       Family History:   Family History   Problem Relation Age of Onset    Hypertension Mother     Hypertension Father     Hypertension Other     Diabetes Maternal Grandmother     Stroke Maternal Grandmother     Cancer Maternal Grandmother         Kidney CA    Diabetes Maternal Grandfather     Cancer Maternal Grandfather     Cancer Paternal Grandmother         Breast CA, age 52's    Breast Cancer Paternal Grandmother     Cancer Other 39       Social History:   She  reports that  has never smoked. she has never used smokeless tobacco.  She  reports that she drinks alcohol.             Review of Systems:   General: negative for - chills, fatigue, fever, weight change  Psych: negative for - anxiety, depression, irritability or mood swings  ENT: negative for - headaches, hearing change, nasal congestion, oral lesions, sneezing or sore throat  Heme/ Lymph: negative for - bleeding problems, bruising, pallor or swollen lymph nodes  Endo: negative for - hot flashes, polydipsia/polyuria or temperature intolerance  Resp: negative for - cough, shortness of breath or wheezing  CV: negative for - chest pain, edema or palpitations  GI: negative for - abdominal pain, change in bowel habits, constipation, diarrhea or nausea/vomiting  : negative for - dysuria, hematuria, incontinence, pelvic pain or vulvar/vaginal symptoms  MSK: negative for - joint pain, joint swelling or muscle pain  Neuro: negative for - confusion, headaches, seizures or weakness  Derm: negative for - dry skin, hair changes, rash or skin lesion changes          Physical:   Vitals:   Vitals:    10/24/18 1135 10/24/18 1151   BP: (!) 171/111 (!) 174/119   Pulse: 83 83   Resp: 18    Temp: 96.9 °F (36.1 °C)    TempSrc: Oral    SpO2: 96%    Weight: 240 lb 12.8 oz (109.2 kg)    Height: 5' 9\" (1.753 m)            Exam:   HEENT- atraumatic,normocephalic, awake, oriented, well nourished, smal ulcer in mouth on the inner aspect of lower lip near the frenulum  Neck - supple,no enlarged lymph nodes, no JVD, no thyromegaly  Chest- CTA, no rhonchi, no crackles  Heart- rrr, no murmurs / gallop/rub  Abdomen- soft,BS+,NT, no hepatosplenomegaly  Ext - no c/c/edema   Neuro- no focal deficits. Power 5/5 all extremities  Skin - warm,dry, no obvious rashes. Review of Data:   LABS:   Lab Results   Component Value Date/Time    WBC 15.3 (H) 08/20/2018 05:37 PM    HGB 12.3 08/20/2018 05:37 PM    HCT 36.7 08/20/2018 05:37 PM    PLATELET 721 26/73/4607 05:37 PM     Lab Results   Component Value Date/Time    Sodium 139 08/21/2018 05:10 AM    Potassium 3.6 08/21/2018 05:10 AM    Chloride 102 08/21/2018 05:10 AM    CO2 27 08/21/2018 05:10 AM    Glucose 114 (H) 08/21/2018 05:10 AM    BUN 10 08/21/2018 05:10 AM    Creatinine 0.81 08/21/2018 05:10 AM     Lab Results   Component Value Date/Time    Cholesterol, total 178 06/12/2018 12:29 PM    HDL Cholesterol 78 (H) 06/12/2018 12:29 PM    LDL, calculated 87 06/12/2018 12:29 PM    Triglyceride 65 06/12/2018 12:29 PM     No results found for: GPT        Impression / Plan:        ICD-10-CM ICD-9-CM    1. Candidal vaginitis B37.3 112.1 fluconazole (DIFLUCAN) 150 mg tablet   2. Oral ulcer K12.1 528.9 lidocaine (XYLOCAINE) 2 % solution   3. Essential hypertension I10 401.9 metoprolol succinate (TOPROL-XL) 50 mg XL tablet           Low salt diet    Stop doing peroxide mouth gargles. Explained to patient risk benefits of the medications. Advised patient to stop meds if having any side effects. Pt verbalized understanding of the instructions. I have discussed the diagnosis with the patient and the intended plan as seen in the above orders. The patient has received an after-visit summary and questions were answered concerning future plans. I have discussed medication side effects and warnings with the patient as well. I have reviewed the plan of care with the patient, accepted their input and they are in agreement with the treatment goals. Reviewed plan of care. Patient has provided input and agrees with goals.     Follow-up Disposition: Not on Christine Joseph MD

## 2018-10-24 NOTE — PROGRESS NOTES
Chief Complaint   Patient presents with    Vaginal Itching    Gum Problem     1. Have you been to the ER, urgent care clinic since your last visit? Hospitalized since your last visit? Yes When: 10/20/201 Where: [facility] Reason for visit: UTI    2. Have you seen or consulted any other health care providers outside of the [address] [place] [address] since your last visit? Include any pap smears or colon screening.  No

## 2024-01-22 NOTE — ED NOTES
I have reviewed discharge instructions with the patient. The patient verbalized understanding. Patient NAD, VSS. Patient armband removed and shredded. No

## (undated) DEVICE — SUTURE VCRL SZ 0 L18IN ABSRB VLT L26MM CT-2 1/2 CIR J727D

## (undated) DEVICE — TRAY CATH 16F URIN MTR LTX -- CONVERT TO ITEM 363111

## (undated) DEVICE — SPONGE LAP 18X18IN STRL -- 5/PK

## (undated) DEVICE — (D)GLOVE SURG TRIFLX 8 PWD LTX -- DISC BY MFR USE ITEM 302994

## (undated) DEVICE — INTENDED FOR TISSUE SEPARATION, AND OTHER PROCEDURES THAT REQUIRE A SHARP SURGICAL BLADE TO PUNCTURE OR CUT.: Brand: BARD-PARKER ® CARBON RIB-BACK BLADES

## (undated) DEVICE — DEPAUL MAJOR PROCEDURE PACK: Brand: MEDLINE INDUSTRIES, INC.

## (undated) DEVICE — SUTURE VCRL SZ 3-0 L18IN ABSRB UD L26MM SH 1/2 CIR J864D

## (undated) DEVICE — SKIN MARKER,REGULAR TIP WITH RULER AND LABELS: Brand: DEVON

## (undated) DEVICE — KENDALL SCD EXPRESS SLEEVES, KNEE LENGTH, MEDIUM: Brand: KENDALL SCD

## (undated) DEVICE — SUTURE VCRL SZ 0 L18IN ABSRB UD POLYGLACTIN 910 BRAID TIE J912G

## (undated) DEVICE — PREP SKN CHLRAPRP 26ML TNT -- CONVERT TO ITEM 373320

## (undated) DEVICE — SUTURE VCRL SZ 0 L36IN ABSRB UD L36MM CT-1 1/2 CIR J946H

## (undated) DEVICE — SUTURE MCRYL SZ 4-0 L18IN ABSRB UD L19MM PS-2 3/8 CIR PRIM Y496G

## (undated) DEVICE — TELFA NON-ADHERENT PADS PREPAK: Brand: TELFA

## (undated) DEVICE — GOWN,AURORA,FABRIC-REINFORCED,X-LARGE: Brand: MEDLINE

## (undated) DEVICE — HEX-LOCKING BLADE ELECTRODE: Brand: EDGE

## (undated) DEVICE — CURVED, LARGE JAW, OPEN SEALER/DIVIDER NANO-COATED: Brand: LIGASURE IMPACT

## (undated) DEVICE — TRAY PREP DRY W/ PREM GLV 2 APPL 6 SPNG 2 UNDPD 1 OVERWRAP

## (undated) DEVICE — DRAPE THER FLUID WARMING 66X44 IN FLAT SLUSH DBL DISC ORS

## (undated) DEVICE — SOL IRR NACL 0.9% 500ML POUR --

## (undated) DEVICE — SUTURE VCRL SZ 0 L27IN ABSRB UD L36MM CT-1 1/2 CIR J260H

## (undated) DEVICE — SUT CHRMC 0 27IN CT1 BRN --